# Patient Record
Sex: FEMALE | URBAN - METROPOLITAN AREA
[De-identification: names, ages, dates, MRNs, and addresses within clinical notes are randomized per-mention and may not be internally consistent; named-entity substitution may affect disease eponyms.]

---

## 2023-01-17 ENCOUNTER — OFFICE VISIT (OUTPATIENT)
Dept: SURGERY | Age: 43
End: 2023-01-17
Payer: MEDICAID

## 2023-01-17 ENCOUNTER — HOSPITAL ENCOUNTER (OUTPATIENT)
Dept: LAB | Age: 43
Discharge: HOME | End: 2023-01-17

## 2023-01-17 VITALS
RESPIRATION RATE: 16 BRPM | BODY MASS INDEX: 47.12 KG/M2 | SYSTOLIC BLOOD PRESSURE: 110 MMHG | WEIGHT: 276 LBS | TEMPERATURE: 97.7 F | HEIGHT: 64 IN | DIASTOLIC BLOOD PRESSURE: 70 MMHG | HEART RATE: 80 BPM | OXYGEN SATURATION: 99 %

## 2023-01-17 DIAGNOSIS — E66.01 MORBID OBESITY (HCC): Primary | ICD-10-CM

## 2023-01-17 DIAGNOSIS — E66.01 MORBID OBESITY (HCC): ICD-10-CM

## 2023-01-17 PROCEDURE — 99001 SPECIMEN HANDLING PT-LAB: CPT

## 2023-01-17 PROCEDURE — 99205 OFFICE O/P NEW HI 60 MIN: CPT | Performed by: STUDENT IN AN ORGANIZED HEALTH CARE EDUCATION/TRAINING PROGRAM

## 2023-01-17 NOTE — PROGRESS NOTES
Bariatric Surgery Initial Consult    Patient: Raul Lora MRN: [id number]  SSN: [id number]    YOB: 1980  Age: 36 y.o. Sex: female      Subjective:      CC: Morbid Obesity    Current Weight: 276  Body mass index is 47.38 kg/m². Ideal body weight: 54.7 kg (120 lb 9.5 oz)  Adjusted ideal body weight: 82.9 kg (182 lb 12.1 oz)  Excess Body Weight: 145    Raul Lora is a 36 y.o. female who is being seen for new bariatric consultation. She is struggled with her weight for a long period of time and has been considering bariatric surgery for about 2 years. She is previously able to lose weight and get down to around 230 with increased exercise but then regained to her current weight. She reports being at her highest weight now. She identifies eating late at night as being an issue with her diet. She has tried unsupervised diets in the past including Atkins, calorie counting, Noom, and intermittent fasting without lasting or sustained success. She reports increased weight causing arthropathy particularly in her knees as well as some dyspnea on exertion and limiting further exercise. She is interested in a sleeve gastrectomy. PMH: Denies    PSH: Lap hysterectomy, L breast lumpectomy 2016    NKDA    Social History     Tobacco Use    Smoking status: Never    Smokeless tobacco: Never   Substance Use Topics    Alcohol use: Not on file   - Smokes marijuana 1-3 times per week    FH: reviewed, strong family history of obesity as well as hypertension in her father     Last Pap Smear: N/A, no cervix post hysterectomy  Mammogram: Up-to-date  Colonoscopy: N/A  EGD/UGI: None  STOP-BANG score: 2  GERD-HRQL score: 0    Review of Systems:    General: Denies fevers, chills, night sweats, fatigue, weight loss, or weight gain.     HEENT: Denies changes in auditory or visual acuity, recurrent pharyngitis, epistaxis, chronic rhinorrhea, vertigo    Respiratory: + Shortness of the breath with exertion. Denies  productive cough, hemoptysis    Cardiac: Denies known history of cardiac disease, heart murmur, palpitations    GI: + Occasional loose stools after eating, particularly after greasy food or lactose. Denies dysphagia, recurrent emesis, hematemesis, changes in bowel habits, hematochezia, melena    : Denies hematuria frequency urgency dysuria    Musculoskeletal: Denies fractures, dislocations    Neurologic: Denies history of CVA, paralysis paresthesias, recurrent cephalgia, seizures    Endocrine: Denies polyuria, polydipsia, polyphagia, heat and cold intolerance    Lymph/heme: Denies a history of malignancy, anemia, bruising, blood transfusions    Integumentary: Negative for dermatitis     Psych: Denies a history of depression or anxiety    Objective:     Vitals:    01/17/23 1141   BP: 110/70   Pulse: 80   Resp: 16   Temp: 97.7 °F (36.5 °C)   TempSrc: Temporal   SpO2: 99%   Weight: 125.2 kg (276 lb)   Height: 5' 4\" (1.626 m)        General: no acute distress, nontoxic in appearance. Head: Normocephalic, atraumatic  Mouth: Clear, no overt lesions, oral mucosa is pink and moist.  Neck: Supple, no masses, trachea midline  Resp: Clear to auscultation bilaterally, no wheezing. Excursions normal and symmetrical.  Cardio: Regular rate and rhythm, no murmurs  Abdomen: soft, nontender, nondistended, no hernias. Extremities: Warm, well perfused, no tenderness or swelling, normal gait/station, without edema or varicosities  Neuro: Sensation and strength grossly intact and symmetrical.  Psych: Alert and oriented to person, place, and time. Labs:     N/A      Assessment: This patient is a 36 y.o. obese female with a current Body mass index is 47.38 kg/m². who is considering bariatric surgery, and would be an appropriate candidate for enrollment in the preoperative pathway.      The patient is considering Laparoscopic Sleeve Gastrectomy for surgical weight loss due to their ineffective progress with medical forms of weight loss and the urging of their physicians who care for their primary medical issues. The patient  now presents  for consideration for weight loss surgery understanding the benefits of this over a medical approach of weight loss as was discussed in our seminar on weight loss surgery. They have discussed their plans both with their family and primary care physician who is in support of their pursuit of such. The patient's goal weight is 175 lb. These goals are consistent with expected outcomes of their desired operation. Her Medical goals are resolution of these health issues. The possible short and long term  complications of the sleeve gastrectomy were also discussed, to include but not limited to;death, DVT/PE, staple line leak, bleeding, stricture formation, surgical site infection, and reflux. I spent a significant amount of time emphasizing the reflux risk from this procedure which ranges from 25-40% postoperatively. Specific weight related outcomes for success were also discussed with an emphasis on careful and close follow-up with the first year and dietary behavior modification over the first years as baseline cyclical hunger returns  The patient expressed an understanding of the above factors, and her questions were answered in their entirety. Plan: Will plan to enroll in the preoperative bariatric pathway  Will schedule consultation with our dietitian  Will schedule for preoperative psych clearance  Standard preoperative bariatric lab panel ordered. H. pylori breath test performed in clinic today  Upper GI, EKG ordered  Return to clinic for midpoint evaluation      I spent >60 minutes on this patient's care today, including reviewing all pertinent medical records, imaging, performing a history and physical exam, documenting, and coordinating future care.     Signed By: Chris Mitchell MD     January 17, 2023

## 2023-01-18 LAB — XX-LABCORP SPECIMEN COL,LCBCF: NORMAL

## 2023-01-19 LAB — UREA BREATH TEST QL: POSITIVE

## 2023-01-20 DIAGNOSIS — A04.8 H. PYLORI INFECTION: Primary | ICD-10-CM

## 2023-01-20 RX ORDER — OMEPRAZOLE 40 MG/1
40 CAPSULE, DELAYED RELEASE ORAL 2 TIMES DAILY
Qty: 28 CAPSULE | Refills: 0 | Status: SHIPPED | OUTPATIENT
Start: 2023-01-20 | End: 2023-02-03

## 2023-01-20 RX ORDER — TETRACYCLINE HYDROCHLORIDE 500 MG/1
500 CAPSULE ORAL
Qty: 56 CAPSULE | Refills: 0 | Status: SHIPPED | OUTPATIENT
Start: 2023-01-20

## 2023-01-20 RX ORDER — METRONIDAZOLE 500 MG/1
500 TABLET ORAL 3 TIMES DAILY
Qty: 42 TABLET | Refills: 0 | Status: SHIPPED | OUTPATIENT
Start: 2023-01-20 | End: 2023-02-03

## 2023-01-20 RX ORDER — BISMUTH SUBSALICYLATE 262 MG/1
2 TABLET, CHEWABLE ORAL EVERY 6 HOURS
Qty: 112 TABLET | Refills: 0 | Status: SHIPPED | OUTPATIENT
Start: 2023-01-20 | End: 2023-02-03

## 2023-01-24 ENCOUNTER — HOSPITAL ENCOUNTER (OUTPATIENT)
Dept: BARIATRICS/WEIGHT MGMT | Age: 43
Discharge: HOME | End: 2023-01-24

## 2023-01-24 VITALS — HEIGHT: 64 IN | WEIGHT: 272.8 LBS | BODY MASS INDEX: 46.57 KG/M2

## 2024-03-08 ENCOUNTER — HOSPITAL ENCOUNTER (OUTPATIENT)
Facility: HOSPITAL | Age: 44
End: 2024-03-08
Payer: MEDICAID

## 2024-03-08 ENCOUNTER — OFFICE VISIT (OUTPATIENT)
Age: 44
End: 2024-03-08

## 2024-03-08 ENCOUNTER — HOSPITAL ENCOUNTER (OUTPATIENT)
Facility: HOSPITAL | Age: 44
Discharge: HOME | End: 2024-03-11

## 2024-03-08 VITALS
DIASTOLIC BLOOD PRESSURE: 90 MMHG | HEIGHT: 64 IN | WEIGHT: 281.7 LBS | TEMPERATURE: 97.5 F | SYSTOLIC BLOOD PRESSURE: 150 MMHG | OXYGEN SATURATION: 100 % | HEART RATE: 81 BPM | BODY MASS INDEX: 48.09 KG/M2

## 2024-03-08 DIAGNOSIS — F12.90 MARIJUANA USE: ICD-10-CM

## 2024-03-08 DIAGNOSIS — Z87.2 HISTORY OF BREAST ABSCESS: ICD-10-CM

## 2024-03-08 DIAGNOSIS — A04.8 H. PYLORI INFECTION: ICD-10-CM

## 2024-03-08 DIAGNOSIS — E66.01 MORBID OBESITY WITH BODY MASS INDEX (BMI) OF 40.0 TO 49.9 (HCC): ICD-10-CM

## 2024-03-08 DIAGNOSIS — E66.01 MORBID OBESITY (HCC): Primary | ICD-10-CM

## 2024-03-08 DIAGNOSIS — Z01.818 PRE-OP EVALUATION: ICD-10-CM

## 2024-03-08 DIAGNOSIS — K30 FUNCTIONAL DYSPEPSIA: ICD-10-CM

## 2024-03-08 LAB — LABCORP SPECIMEN COLLECTION: NORMAL

## 2024-03-08 PROCEDURE — 99001 SPECIMEN HANDLING PT-LAB: CPT

## 2024-03-08 PROCEDURE — 93005 ELECTROCARDIOGRAM TRACING: CPT | Performed by: SPECIALIST

## 2024-03-08 ASSESSMENT — PATIENT HEALTH QUESTIONNAIRE - PHQ9
2. FEELING DOWN, DEPRESSED OR HOPELESS: 0
SUM OF ALL RESPONSES TO PHQ QUESTIONS 1-9: 0
SUM OF ALL RESPONSES TO PHQ9 QUESTIONS 1 & 2: 0
SUM OF ALL RESPONSES TO PHQ QUESTIONS 1-9: 0
1. LITTLE INTEREST OR PLEASURE IN DOING THINGS: 0
SUM OF ALL RESPONSES TO PHQ QUESTIONS 1-9: 0
SUM OF ALL RESPONSES TO PHQ QUESTIONS 1-9: 0

## 2024-03-08 NOTE — PROGRESS NOTES
Bariatric Surgery Consultation  Restart from Jan 2023 Dr. Escobedo Consult at 276 lbs    Subjective:     The patient is a 43 y.o. obese female with a Body mass index is 48.35 kg/m²..  The patient is currently her heaviest weight for the past several years.  she has been overweight since her teen years.   she has been considering surgery since last year.  she desires surgery at this time because of multiple health concerns and their lifestyle issues which are hindered by their weight. she has been referred by his family physician for evaluation and treatment of their obesity via surgical intervention. Yugonda Sample-Jones has tried multiple diets in her lifetime most recently tried multiple dietary plans both formal and informal.    Bariatric comorbidities present are   Patient Active Problem List   Diagnosis    Morbid obesity (HCC)    Morbid obesity with body mass index (BMI) of 40.0 to 49.9 (HCC)    Functional dyspepsia    H. pylori infection    Marijuana use    History of breast abscess     The patient is considering sleeve resection for surgical weight loss due to their ineffective progress with medical forms of weight loss and the urging of their physician who cares for their primary medical issues. The patient  now presents  for consideration for weight loss surgery understanding the benefits of this over a medical approach of weight loss as was discussed in our presentation on weight loss surgery. They have discussed their plans both with their family and primary care physician who is in support of their pursuit of such. The patient has had no health issues as of late and denies and gastrointestinal disturbances other than what is outlined below in their review of symptoms. All of their prior evaluations available by both their PCP's and specialists physicians have been reviewed today either in the Care Everywhere portal or scanned under the media tab.    I have spent a large portion of my initial

## 2024-03-09 LAB
ALBUMIN SERPL-MCNC: 3.9 G/DL (ref 3.9–4.9)
ALBUMIN/GLOB SERPL: 1.3 {RATIO} (ref 1.2–2.2)
ALP SERPL-CCNC: 80 IU/L (ref 44–121)
ALT SERPL-CCNC: 11 IU/L (ref 0–32)
AST SERPL-CCNC: 13 IU/L (ref 0–40)
BASOPHILS # BLD AUTO: 0.1 X10E3/UL (ref 0–0.2)
BASOPHILS NFR BLD AUTO: 1 %
BILIRUB SERPL-MCNC: 0.2 MG/DL (ref 0–1.2)
BUN SERPL-MCNC: 12 MG/DL (ref 6–24)
BUN/CREAT SERPL: 15 (ref 9–23)
CALCIUM SERPL-MCNC: 9.3 MG/DL (ref 8.7–10.2)
CHLORIDE SERPL-SCNC: 104 MMOL/L (ref 96–106)
CO2 SERPL-SCNC: 22 MMOL/L (ref 20–29)
CREAT SERPL-MCNC: 0.79 MG/DL (ref 0.57–1)
EGFRCR SERPLBLD CKD-EPI 2021: 95 ML/MIN/1.73
EKG ATRIAL RATE: 80 BPM
EKG DIAGNOSIS: NORMAL
EKG P AXIS: 14 DEGREES
EKG P-R INTERVAL: 168 MS
EKG Q-T INTERVAL: 392 MS
EKG QRS DURATION: 74 MS
EKG QTC CALCULATION (BAZETT): 452 MS
EKG R AXIS: 52 DEGREES
EKG T AXIS: 17 DEGREES
EKG VENTRICULAR RATE: 80 BPM
EOSINOPHIL # BLD AUTO: 0.4 X10E3/UL (ref 0–0.4)
EOSINOPHIL NFR BLD AUTO: 6 %
ERYTHROCYTE [DISTWIDTH] IN BLOOD BY AUTOMATED COUNT: 13.1 % (ref 11.7–15.4)
FERRITIN SERPL-MCNC: 74 NG/ML (ref 15–150)
GLOBULIN SER CALC-MCNC: 3.1 G/DL (ref 1.5–4.5)
GLUCOSE SERPL-MCNC: 92 MG/DL (ref 70–99)
HCT VFR BLD AUTO: 46.3 % (ref 34–46.6)
HGB BLD-MCNC: 14.6 G/DL (ref 11.1–15.9)
IMM GRANULOCYTES # BLD AUTO: 0 X10E3/UL (ref 0–0.1)
IMM GRANULOCYTES NFR BLD AUTO: 0 %
LYMPHOCYTES # BLD AUTO: 2.3 X10E3/UL (ref 0.7–3.1)
LYMPHOCYTES NFR BLD AUTO: 41 %
MCH RBC QN AUTO: 29.5 PG (ref 26.6–33)
MCHC RBC AUTO-ENTMCNC: 31.5 G/DL (ref 31.5–35.7)
MCV RBC AUTO: 94 FL (ref 79–97)
MONOCYTES # BLD AUTO: 0.4 X10E3/UL (ref 0.1–0.9)
MONOCYTES NFR BLD AUTO: 6 %
NEUTROPHILS # BLD AUTO: 2.6 X10E3/UL (ref 1.4–7)
NEUTROPHILS NFR BLD AUTO: 46 %
PLATELET # BLD AUTO: 277 X10E3/UL (ref 150–450)
POTASSIUM SERPL-SCNC: 4.5 MMOL/L (ref 3.5–5.2)
PROT SERPL-MCNC: 7 G/DL (ref 6–8.5)
RBC # BLD AUTO: 4.95 X10E6/UL (ref 3.77–5.28)
SODIUM SERPL-SCNC: 139 MMOL/L (ref 134–144)
T4 FREE SERPL-MCNC: 1 NG/DL (ref 0.82–1.77)
TSH SERPL DL<=0.005 MIU/L-ACNC: 1.02 UIU/ML (ref 0.45–4.5)
WBC # BLD AUTO: 5.7 X10E3/UL (ref 3.4–10.8)

## 2024-03-09 PROCEDURE — 93010 ELECTROCARDIOGRAM REPORT: CPT | Performed by: INTERNAL MEDICINE

## 2024-03-19 ENCOUNTER — TELEPHONE (OUTPATIENT)
Age: 44
End: 2024-03-19

## 2024-03-19 RX ORDER — BISMUTH SUBCITRATE POTASSIUM, METRONIDAZOLE AND TETRACYCLINE HYDROCHLORIDE 140; 125; 125 MG/1; MG/1; MG/1
3 CAPSULE ORAL
Qty: 120 CAPSULE | Refills: 0 | Status: SHIPPED | OUTPATIENT
Start: 2024-03-19 | End: 2024-03-29

## 2024-03-19 RX ORDER — OMEPRAZOLE 20 MG/1
20 CAPSULE, DELAYED RELEASE ORAL 2 TIMES DAILY
Qty: 20 CAPSULE | Refills: 0 | Status: SHIPPED | OUTPATIENT
Start: 2024-03-19

## 2024-03-19 NOTE — TELEPHONE ENCOUNTER
Per Jerry CARRILLO pt needs to be treated for H pylori. Pylera pack and omeprazole sent into pharm on file. Pt is aware.

## 2024-03-27 ENCOUNTER — HOSPITAL ENCOUNTER (OUTPATIENT)
Facility: HOSPITAL | Age: 44
Setting detail: OUTPATIENT SURGERY
Discharge: HOME | End: 2024-03-30
Payer: MEDICAID

## 2024-03-27 ENCOUNTER — HOSPITAL ENCOUNTER (OUTPATIENT)
Facility: HOSPITAL | Age: 44
Discharge: HOME | End: 2024-03-30
Payer: MEDICAID

## 2024-03-27 VITALS
BODY MASS INDEX: 48.65 KG/M2 | OXYGEN SATURATION: 98 % | TEMPERATURE: 98 F | WEIGHT: 285 LBS | SYSTOLIC BLOOD PRESSURE: 135 MMHG | DIASTOLIC BLOOD PRESSURE: 79 MMHG | HEIGHT: 64 IN | RESPIRATION RATE: 17 BRPM | HEART RATE: 87 BPM

## 2024-03-27 DIAGNOSIS — E66.01 MORBID OBESITY (HCC): ICD-10-CM

## 2024-03-27 PROCEDURE — 74220 X-RAY XM ESOPHAGUS 1CNTRST: CPT

## 2024-03-27 PROCEDURE — 74240 X-RAY XM UPR GI TRC 1CNTRST: CPT

## 2024-03-27 PROCEDURE — 2500000003 HC RX 250 WO HCPCS: Performed by: SPECIALIST

## 2024-03-27 RX ADMIN — BARIUM SULFATE 100 ML: 960 POWDER, FOR SUSPENSION ORAL at 15:30

## 2024-03-27 ASSESSMENT — PAIN - FUNCTIONAL ASSESSMENT: PAIN_FUNCTIONAL_ASSESSMENT: NONE - DENIES PAIN

## 2024-03-27 NOTE — PROCEDURES
Patient:Yugonda Sample-Jones   : 1980  Medical Record Number:[id number]            PREPROCEDURE DIAGNOSIS: This patient is preoperative for laparoscopic sleeve gastrectomy procedure with a history of  reflux disease.    POSTPROCEDURE DIAGNOSIS: This patient is preoperative for laparoscopic sleeve gastrectomy procedure with a history of  reflux disease.      PROCEDURES PERFORMED: Upper GI study with barium.    ESTIMATED BLOOD LOSS: None.    SPECIMENS: None.    STATEMENT OF MEDICAL NECESSITY: The patient is a patient with a  longstanding history of obesity. They are now considering the laparoscopic sleeve gastrectomy procedure as a means of surgical weight control and due to their history of reflux disease and are being assessed preoperatively for such.    DESCRIPTION OF PROCEDURE: The patient was brought to the fluoroscopy unit and  was given thin barium. On swallowing of barium, they were noted to have  normal peristalsis of their esophagus. They had prompt filling of distal  esophagus with tapering into the gastroesophageal junction. There was no evidence of a hiatal hernia present. Contrast then filled the gastric cardia, fundus,body and pre pyloric region with no abnormalities noted. Contrast then exited the pylorus in normal fashion. No obstruction was noted. There was no evidence of reflux noted.    (normal anatomy)    Jonathan Barlow MD

## 2024-03-27 NOTE — PROGRESS NOTES
[facility] UGI FOCUS NOTE      Date:  3/27/2024  Time:  3:29 PM    Patient:  Yugonda Mayur  Procedure:  UGI      Patient Questions  Lap Band Adjustment Patient Questionnaire:  If female, are you pregnant? []Yes     [x]No  Tolerates thick liquids:  [x]Well   []1     []2     []3     []4     []5     []Poorly  Tolerates red meat:  [x]Well   []1     []2     []3     []4     []5     [] Poorly  Tolerates chicken:  [x]Well   []1     []2     []3     []4     []5     []Poorly  Tolerates fish:   [x]Well   []1     []2     []3     []4     []5     []Poorly  Hunger is:   []Well Controlled     []1     [x]2     []3     []4     []5      [] Poorly Controlled  Nightime regurgitation:  [x]Never     []1     []2     []3     []4     []5     []Frequently    Lap Band Info:  Band Type  []Realize     []Realize-C     []AP     []VG     []10cm     []Unknown  []Other      Comments:        Linn Parikh RN

## 2024-04-03 ENCOUNTER — HOSPITAL ENCOUNTER (OUTPATIENT)
Facility: HOSPITAL | Age: 44
Discharge: HOME | End: 2024-04-06

## 2024-04-03 VITALS — HEIGHT: 64 IN | WEIGHT: 290.3 LBS | BODY MASS INDEX: 49.56 KG/M2

## 2024-04-03 NOTE — PROGRESS NOTES
Medical Weight Loss Multi-Disciplinary Program    Patient's Name: Yugonda Sample-Jones Age: 43 y.o.   YOB: 1980 Sex: female                     Session #1. Pt attended in-person class.  Weight obtained in office.    Date: 4/3/2024    Vitals:    04/03/24 1600   Weight: 131.7 kg (290 lb 4.8 oz)   Height: 1.626 m (5' 4\")      Body mass index is 49.83 kg/m².     Pounds Lost since last month: N/A   Pounds Gained since last month: N/A    Starting Weight: 281.7 lbs  Previous Month’s Weight: N/A  Overall Pounds Lost: 0 lbs  Overall Pounds Gained: 8.6 lbs      Class Guidelines    Guidelines are reviewed with patient at the start of every class.    1. Patient understands that weight loss trial classes must be consecutive.  Patient understands if they miss a class, it is their responsibility to contact me to reschedule class.  I will reach out to patient after their first no show.  2.  Patient understands the expectations that weight maintenance/weight loss is expected during the classes.  Failure to demonstrate changes may result in extension of weight loss trial, followed by returning to see the surgeon.  Patient understands that they CANNOT gain any weight during the weight loss trial.  Gaining weight will result in extension of weight loss trial.  3. Patient is also instructed to complete their labwork, psychological evaluation visit, and any other tests that the surgeon has used while they are working on their weight loss trial.  4.  Patient was instructed to bring their packet of nutrition education materials to every class and appointment.        Eating Habits and Behaviors    Today in class we reviewed the Key Diet Principles.  Patient was encouraged to consume 3 meals each day, and the timing the meals was reviewed.  Meal time behaviors that will help pt to be successful with their weight loss efforts were additionally reviewed.      We discussed the importance of drinking adequate amounts of fluids,

## 2024-05-02 ENCOUNTER — HOSPITAL ENCOUNTER (OUTPATIENT)
Facility: HOSPITAL | Age: 44
Discharge: HOME | End: 2024-05-05

## 2024-05-02 VITALS — HEIGHT: 64 IN | BODY MASS INDEX: 48.91 KG/M2 | WEIGHT: 286.5 LBS

## 2024-05-02 NOTE — PROGRESS NOTES
Medical Weight Loss Multi-Disciplinary Program    Patient's Name: Yugonda Sample-Jones Age: 43 y.o.   YOB: 1980 Sex: female     Session #2. Pt attended in-person class.  Weight obtained in office.    Date: 5/2/2024    Vitals:    05/02/24 1100   Weight: 130 kg (286 lb 8 oz)   Height: 1.626 m (5' 4\")      Body mass index is 49.18 kg/m².              Pounds Lost since last month: 3.8 lbs  Pounds Gained since last month: 0 lbs       Starting Weight: 281.7 lbs Previous Month’s Weight: 290.3 lbs   Overall Pounds Lost: 0 lbs Overall Pounds Gained: 4.8 lbs             Class Guidelines    Guidelines are reviewed with patient at the start of every class.    1. Patient understands that weight loss trial classes must be consecutive.  Patient understands if they miss a class, it is their responsibility to contact me to reschedule class.  I will reach out to patient after their first no show.  2.  Patient understands the expectations that weight maintenance/weight loss is expected during the classes.  Failure to demonstrate changes may result in extension of weight loss trial, followed by returning to see the surgeon.  Patient understands that they CANNOT gain any weight during the weight loss trial.  Gaining weight will result in extension of weight loss trial.  3. Patient is also instructed to complete their labwork, psychological evaluation visit, and any other tests that the surgeon has used while they are working on their weight loss trial.  4.  Patient was instructed to bring their packet of nutrition education materials to every class and appointment.        Eating Habits and Behaviors    Today in class we reviewed the Key Diet Principles.  Patient was encouraged to consume 3 meals each day, and the timing the meals was reviewed.  Meal time behaviors that will help pt to be successful with their weight loss efforts were additionally reviewed.      We discussed the importance of drinking adequate amounts of

## 2024-05-22 ENCOUNTER — OFFICE VISIT (OUTPATIENT)
Age: 44
End: 2024-05-22
Payer: MEDICAID

## 2024-05-22 VITALS
DIASTOLIC BLOOD PRESSURE: 77 MMHG | HEART RATE: 89 BPM | WEIGHT: 288 LBS | BODY MASS INDEX: 49.17 KG/M2 | RESPIRATION RATE: 16 BRPM | TEMPERATURE: 97.3 F | HEIGHT: 64 IN | OXYGEN SATURATION: 99 % | SYSTOLIC BLOOD PRESSURE: 134 MMHG

## 2024-05-22 DIAGNOSIS — E66.01 MORBID OBESITY WITH BODY MASS INDEX (BMI) OF 40.0 TO 49.9 (HCC): ICD-10-CM

## 2024-05-22 DIAGNOSIS — E66.01 MORBID OBESITY (HCC): Primary | ICD-10-CM

## 2024-05-22 PROBLEM — Z87.2 HISTORY OF BREAST ABSCESS: Status: RESOLVED | Noted: 2018-01-01 | Resolved: 2024-05-22

## 2024-05-22 PROCEDURE — 99214 OFFICE O/P EST MOD 30 MIN: CPT | Performed by: SPECIALIST

## 2024-05-22 ASSESSMENT — PATIENT HEALTH QUESTIONNAIRE - PHQ9
SUM OF ALL RESPONSES TO PHQ QUESTIONS 1-9: 0
SUM OF ALL RESPONSES TO PHQ QUESTIONS 1-9: 0
1. LITTLE INTEREST OR PLEASURE IN DOING THINGS: NOT AT ALL
2. FEELING DOWN, DEPRESSED OR HOPELESS: NOT AT ALL
SUM OF ALL RESPONSES TO PHQ QUESTIONS 1-9: 0
SUM OF ALL RESPONSES TO PHQ9 QUESTIONS 1 & 2: 0
SUM OF ALL RESPONSES TO PHQ QUESTIONS 1-9: 0

## 2024-05-22 NOTE — PROGRESS NOTES
Range    WBC 5.7 3.4 - 10.8 x10E3/uL    RBC 4.95 3.77 - 5.28 x10E6/uL    Hemoglobin 14.6 11.1 - 15.9 g/dL    Hematocrit 46.3 34.0 - 46.6 %    MCV 94 79 - 97 fL    MCH 29.5 26.6 - 33.0 pg    MCHC 31.5 31.5 - 35.7 g/dL    RDW 13.1 11.7 - 15.4 %    Platelets 277 150 - 450 x10E3/uL    Neutrophils % 46 Not Estab. %    Lymphocytes % 41 Not Estab. %    Monocytes % 6 Not Estab. %    Eosinophils % 6 Not Estab. %    Basophils % 1 Not Estab. %    Neutrophils Absolute 2.6 1.4 - 7.0 x10E3/uL    Lymphocytes Absolute 2.3 0.7 - 3.1 x10E3/uL    Monocytes Absolute 0.4 0.1 - 0.9 x10E3/uL    Eosinophils Absolute 0.4 0.0 - 0.4 x10E3/uL    Basophils Absolute 0.1 0.0 - 0.2 x10E3/uL    Immature Granulocytes % 0 Not Estab. %    Immature Grans (Abs) 0.0 0.0 - 0.1 x10E3/uL   Comprehensive Metabolic Panel    Collection Time: 03/08/24 12:00 AM   Result Value Ref Range    Glucose 92 70 - 99 mg/dL    BUN 12 6 - 24 mg/dL    Creatinine 0.79 0.57 - 1.00 mg/dL    Est, Glom Filt Rate 95 >59 mL/min/1.73    BUN/Creatinine Ratio 15 9 - 23    Sodium 139 134 - 144 mmol/L    Potassium 4.5 3.5 - 5.2 mmol/L    Chloride 104 96 - 106 mmol/L    CO2 22 20 - 29 mmol/L    Calcium 9.3 8.7 - 10.2 mg/dL    Total Protein 7.0 6.0 - 8.5 g/dL    Albumin 3.9 3.9 - 4.9 g/dL    Globulin, Total 3.1 1.5 - 4.5 g/dL    Albumin/Globulin Ratio 1.3 1.2 - 2.2    Total Bilirubin 0.2 0.0 - 1.2 mg/dL    Alkaline Phosphatase 80 44 - 121 IU/L    AST 13 0 - 40 IU/L    ALT 11 0 - 32 IU/L   Ferritin    Collection Time: 03/08/24 12:00 AM   Result Value Ref Range    Ferritin 74 15 - 150 ng/mL   TSH + Free T4 Panel    Collection Time: 03/08/24 12:00 AM   Result Value Ref Range    TSH 1.020 0.450 - 4.500 uIU/mL    T4 Free 1.00 0.82 - 1.77 ng/dL   EKG 12 Lead    Collection Time: 03/08/24 11:46 AM   Result Value Ref Range    Ventricular Rate 80 BPM    Atrial Rate 80 BPM    P-R Interval 168 ms    QRS Duration 74 ms    Q-T Interval 392 ms    QTc Calculation (Bazett) 452 ms    P Axis 14 degrees    R

## 2024-05-28 DIAGNOSIS — Z01.812 PRE-OPERATIVE LABORATORY EXAMINATION: ICD-10-CM

## 2024-05-28 DIAGNOSIS — E66.01 MORBID OBESITY WITH BODY MASS INDEX (BMI) OF 40.0 TO 49.9 (HCC): ICD-10-CM

## 2024-05-28 DIAGNOSIS — E66.01 MORBID OBESITY (HCC): Primary | ICD-10-CM

## 2024-05-28 DIAGNOSIS — E66.01 MORBID OBESITY (HCC): ICD-10-CM

## 2024-05-29 ENCOUNTER — HOSPITAL ENCOUNTER (OUTPATIENT)
Facility: HOSPITAL | Age: 44
Discharge: HOME | End: 2024-06-01

## 2024-05-29 LAB — LABCORP SPECIMEN COLLECTION: NORMAL

## 2024-05-29 PROCEDURE — 99001 SPECIMEN HANDLING PT-LAB: CPT

## 2024-05-30 LAB
ABO GROUP BLD: NORMAL
ALBUMIN SERPL-MCNC: 4.1 G/DL (ref 3.9–4.9)
ALBUMIN/GLOB SERPL: 1.6 {RATIO} (ref 1.2–2.2)
ALP SERPL-CCNC: 78 IU/L (ref 44–121)
ALT SERPL-CCNC: 19 IU/L (ref 0–32)
AST SERPL-CCNC: 19 IU/L (ref 0–40)
B-HCG SERPL QL: NEGATIVE MIU/ML
BASOPHILS # BLD AUTO: 0.1 X10E3/UL (ref 0–0.2)
BASOPHILS NFR BLD AUTO: 1 %
BILIRUB SERPL-MCNC: 0.4 MG/DL (ref 0–1.2)
BLD GP AB SCN SERPL QL: NEGATIVE
BUN SERPL-MCNC: 9 MG/DL (ref 6–24)
BUN/CREAT SERPL: 12 (ref 9–23)
CALCIUM SERPL-MCNC: 9.6 MG/DL (ref 8.7–10.2)
CHLORIDE SERPL-SCNC: 103 MMOL/L (ref 96–106)
CO2 SERPL-SCNC: 28 MMOL/L (ref 20–29)
CREAT SERPL-MCNC: 0.73 MG/DL (ref 0.57–1)
EGFRCR SERPLBLD CKD-EPI 2021: 104 ML/MIN/1.73
EOSINOPHIL # BLD AUTO: 0.3 X10E3/UL (ref 0–0.4)
EOSINOPHIL NFR BLD AUTO: 5 %
ERYTHROCYTE [DISTWIDTH] IN BLOOD BY AUTOMATED COUNT: 13 % (ref 11.7–15.4)
GLOBULIN SER CALC-MCNC: 2.5 G/DL (ref 1.5–4.5)
GLUCOSE SERPL-MCNC: 99 MG/DL (ref 70–99)
HCT VFR BLD AUTO: 44.6 % (ref 34–46.6)
HGB BLD-MCNC: 14.9 G/DL (ref 11.1–15.9)
IMM GRANULOCYTES # BLD AUTO: 0 X10E3/UL (ref 0–0.1)
IMM GRANULOCYTES NFR BLD AUTO: 0 %
LYMPHOCYTES # BLD AUTO: 2.5 X10E3/UL (ref 0.7–3.1)
LYMPHOCYTES NFR BLD AUTO: 43 %
MCH RBC QN AUTO: 30.5 PG (ref 26.6–33)
MCHC RBC AUTO-ENTMCNC: 33.4 G/DL (ref 31.5–35.7)
MCV RBC AUTO: 91 FL (ref 79–97)
MONOCYTES # BLD AUTO: 0.4 X10E3/UL (ref 0.1–0.9)
MONOCYTES NFR BLD AUTO: 7 %
NEUTROPHILS # BLD AUTO: 2.5 X10E3/UL (ref 1.4–7)
NEUTROPHILS NFR BLD AUTO: 44 %
PLATELET # BLD AUTO: 256 X10E3/UL (ref 150–450)
POTASSIUM SERPL-SCNC: 4.4 MMOL/L (ref 3.5–5.2)
PROT SERPL-MCNC: 6.6 G/DL (ref 6–8.5)
RBC # BLD AUTO: 4.89 X10E6/UL (ref 3.77–5.28)
RH BLD: POSITIVE
SODIUM SERPL-SCNC: 139 MMOL/L (ref 134–144)
WBC # BLD AUTO: 5.8 X10E3/UL (ref 3.4–10.8)

## 2024-06-03 ENCOUNTER — HOSPITAL ENCOUNTER (OUTPATIENT)
Facility: HOSPITAL | Age: 44
Discharge: HOME | End: 2024-06-06

## 2024-06-03 ENCOUNTER — HOSPITAL ENCOUNTER (OUTPATIENT)
Facility: HOSPITAL | Age: 44
Discharge: HOME | End: 2024-06-06
Payer: MEDICAID

## 2024-06-03 ENCOUNTER — OFFICE VISIT (OUTPATIENT)
Age: 44
End: 2024-06-03

## 2024-06-03 ENCOUNTER — CLINICAL DOCUMENTATION (OUTPATIENT)
Age: 44
End: 2024-06-03

## 2024-06-03 ENCOUNTER — TRANSCRIBE ORDERS (OUTPATIENT)
Facility: HOSPITAL | Age: 44
End: 2024-06-03

## 2024-06-03 VITALS
BODY MASS INDEX: 49.34 KG/M2 | HEART RATE: 91 BPM | OXYGEN SATURATION: 100 % | DIASTOLIC BLOOD PRESSURE: 117 MMHG | SYSTOLIC BLOOD PRESSURE: 157 MMHG | TEMPERATURE: 97.5 F | WEIGHT: 289 LBS | HEIGHT: 64 IN

## 2024-06-03 DIAGNOSIS — Z01.812 PRE-OPERATIVE LABORATORY EXAMINATION: ICD-10-CM

## 2024-06-03 DIAGNOSIS — E66.01 MORBID OBESITY WITH BODY MASS INDEX (BMI) OF 40.0 TO 49.9 (HCC): ICD-10-CM

## 2024-06-03 DIAGNOSIS — E66.01 MORBID OBESITY (HCC): Primary | ICD-10-CM

## 2024-06-03 DIAGNOSIS — E66.01 MORBID OBESITY (HCC): ICD-10-CM

## 2024-06-03 DIAGNOSIS — G89.18 POST-OP PAIN: Primary | ICD-10-CM

## 2024-06-03 LAB
ABO + RH BLD: NORMAL
BLOOD GROUP ANTIBODIES SERPL: NORMAL
EKG ATRIAL RATE: 77 BPM
EKG DIAGNOSIS: NORMAL
EKG P AXIS: 31 DEGREES
EKG P-R INTERVAL: 162 MS
EKG Q-T INTERVAL: 392 MS
EKG QRS DURATION: 82 MS
EKG QTC CALCULATION (BAZETT): 443 MS
EKG R AXIS: 35 DEGREES
EKG T AXIS: 64 DEGREES
EKG VENTRICULAR RATE: 77 BPM
SPECIMEN EXP DATE BLD: NORMAL

## 2024-06-03 PROCEDURE — 36415 COLL VENOUS BLD VENIPUNCTURE: CPT

## 2024-06-03 PROCEDURE — 86901 BLOOD TYPING SEROLOGIC RH(D): CPT

## 2024-06-03 PROCEDURE — 93005 ELECTROCARDIOGRAM TRACING: CPT

## 2024-06-03 PROCEDURE — 86850 RBC ANTIBODY SCREEN: CPT

## 2024-06-03 PROCEDURE — 86900 BLOOD TYPING SEROLOGIC ABO: CPT

## 2024-06-03 RX ORDER — ONDANSETRON 8 MG/1
8 TABLET, ORALLY DISINTEGRATING ORAL EVERY 8 HOURS PRN
Qty: 30 TABLET | Refills: 0 | Status: SHIPPED | OUTPATIENT
Start: 2024-06-03

## 2024-06-03 RX ORDER — PEDI MULTIVIT NO.25/FOLIC ACID 300 MCG
2 TABLET,CHEWABLE ORAL DAILY
COMMUNITY

## 2024-06-03 RX ORDER — OXYCODONE HYDROCHLORIDE AND ACETAMINOPHEN 5; 325 MG/1; MG/1
1 TABLET ORAL EVERY 6 HOURS PRN
Qty: 28 TABLET | Refills: 0 | Status: SHIPPED | OUTPATIENT
Start: 2024-06-03 | End: 2024-06-10

## 2024-06-03 RX ORDER — ENOXAPARIN SODIUM 100 MG/ML
INJECTION SUBCUTANEOUS
Qty: 28 EACH | Refills: 0 | Status: SHIPPED | OUTPATIENT
Start: 2024-06-03 | End: 2024-06-17

## 2024-06-03 RX ORDER — BACILLUS COAGULANS/INULIN 1B-250 MG
CAPSULE ORAL
COMMUNITY

## 2024-06-03 ASSESSMENT — PATIENT HEALTH QUESTIONNAIRE - PHQ9
SUM OF ALL RESPONSES TO PHQ9 QUESTIONS 1 & 2: 0
SUM OF ALL RESPONSES TO PHQ QUESTIONS 1-9: 0
1. LITTLE INTEREST OR PLEASURE IN DOING THINGS: NOT AT ALL
SUM OF ALL RESPONSES TO PHQ QUESTIONS 1-9: 0
2. FEELING DOWN, DEPRESSED OR HOPELESS: NOT AT ALL

## 2024-06-03 NOTE — PROGRESS NOTES
Patient attended bariatric surgery pre-operative education class today. Patient appeared to have an understanding of the surgical procedure, pre-op and post-op risks, and lifestyle changes.  Patient asked appropriate questions, and all questions were answered in their entirety.  Patient was given a bariatric binder of resources; form acknowledging receipt of said book was completed.  Lovenox injections and Zofran were sent to patient's pharmacy on file.

## 2024-06-03 NOTE — PROGRESS NOTES
foods, patient will need 40-60 g/d protein from supplemental shakes to meet the total daily intake goal of  g/d protein.  Patient understands the importance of being compliant with the diet and vitamin/mineral protocols, as well as the complications and risks that can occur if they are non-compliant.     Patient has also been educated on the pre-op clear liquid diet protocol for 1 day prior to surgery.  Patient understands that failure to comply with following the pre-op clear liquid diet could result in surgery being canceled.     Patient's virtual appointment for 2 week post-op nutrition education has been scheduled.  At this 2 week post-op visit, RD will assess how patient is tolerating liquids.  If patient is tolerating liquid diet without difficulty, RD will recommend advancement of patient's diet to soft/moist (puree) diet to provider.  Patient will also see RD again at 1-month post-op to assess tolerance of soft/moist diet and advance to soft protein with soft vegetables, if soft/moist diet is tolerated without difficulty.  RD will assess patient's compliance with current protocol, including diet, vitamins/minerals, protein shakes, and physical activity.  Post-op diet guidelines will be reinforced.  Patient provided with RD contact information, and RD is available for questions and to meet with patient outside of the 2 week and 1 month post-op visit.    RON DAVIS RD

## 2024-06-03 NOTE — H&P (VIEW-ONLY)
(LOVENOX) 40 MG/0.4ML, Inject 0.4 milliliters subcutaneously every 12 hours (Patient not taking: Reported on 6/3/2024), Disp: 28 each, Rfl: 0    ondansetron (ZOFRAN-ODT) 8 MG TBDP disintegrating tablet, Place 1 tablet under the tongue every 8 hours as needed for Nausea or Vomiting (Patient not taking: Reported on 6/3/2024), Disp: 30 tablet, Rfl: 0  Patient has no known allergies.     Review of Systems:     General - No history or complaints of unexpected fever, chills, or weight loss  Head/Neck - No history or complaints of headache, diplopia, dysphagia, hearing loss  Cardiac - No history or complaints of chest pain, palpitations, murmur, or shortness of breath  Pulmonary - No history or complaints of shortness of breath, productive cough, hemoptysis  Gastrointestinal - No history or complaints of reflux,  abdominal pain, obstipation/constipation, blood per rectum  Genitourinary - No history or complaints of hematuria/dysuria, stress urinary incontinence symptoms, or renal lithiasis  Musculoskeletal - No history or complaints of joint pain or muscular weakness  Hematologic - No history or complaints of bleeding disorders, blood transfusions, sickle cell anemia  Neurologic - No history or complaints of  migraine headaches, seizure activity, syncopal episodes, TIA or stroke  Integumentary - No history or complaints of rashes, abnormal nevi, skin cancer  Gynecological - n/a    Objective:     /65   Pulse 91   Temp 97.5 °F (36.4 °C)   Ht 1.626 m (5' 4\")   Wt 131.1 kg (289 lb)   SpO2 100%   BMI 49.61 kg/m²     Physical Examination: General appearance - alert, well appearing, and in no distress and oriented to person, place, and time  Mental status - alert, oriented to person, place, and time, normal mood, behavior, speech, dress, motor activity, and thought processes  Eyes - pupils equal and reactive, extraocular eye movements intact, sclera anicteric, left eye normal, right eye normal  Ears - right ear  operative risks over gastric bypass.    In the office today, following Yugonda's history and physical examination, a 40 minute discussion regarding the anatomic alterations for the laparoscopic sleeve gastrectomy was undertaken. The dietary expectations and the patient  dependent factors for success were thoroughly discussed, to include the need for interval follow-up and long-term dietary changes associated with success. The possible complications of the sleeve gastrectomy  were also discussed, to include;death, DVT/PE, staple line leak, bleeding, stricture formation, infection, nutritional deficiencies and sleeve dilation.  Specific weight related outcomes for success were also discussed with an emphasis on careful and close follow-up with the first year and eating behavior modification as the baseline and cyclical hunger return.  The patient expressed an understanding of the above factors, and her questions were answered in their entirety.    In addition, the patient attended a 1.5 hour power point seminar regarding obesity, surgical weight loss including, adjustable gastric band, gastric bypass, and sleeve gastrectomy.  This discussion contrasted the different surgical techniques, mechanisms of actions and expected outcomes, and surgical and medical risks associated with each procedure.  During this seminar, there was a long question and answer session where each questions was answered until there were no additional questions.     Today, the patient had all of her questions answered and the decision was made today that the patient's preoperative evaluation is acceptable for them  to proceed with bariatric surgery  choosing the sleeve gastrectomy as her surgical option.    The patient understands the plan of action        Secondary Diagnoses:     DVT / Pulmonary Embolus Risk - The patient is at a higher risk for post operative DVT / pulmonary embolus secondary to their morbid obese status, relative sedentary

## 2024-06-03 NOTE — PROGRESS NOTES
lifestyle, and impending general anesthetic.  We will plan to use anticoagulation therapy pre and post operative as well as  pneumatic compression devices and encourage ambulation once on the hospital nursing floor.  The need for possible at home anticoagulation therapy has also been discussed and any decision on this matter will be made during post operative evaluations. The patient understands that their efforts at ambulation are of vital importance to reduce the risk of this complication thus placing significant burden on them as to the prevention of such issues. Signs and symptoms of DVT / PE have been discussed with the patient and they have been instructed to call the office if any these occur in the \"at home\" post op phase    Hypertension - the patient understands, and we have discussed in detail, that this is an active acute health problem that has a multifactorial effect on their body from the standpoint of healing and complications related to surgery.  They understand that uncontrolled hypertension affects other organ processes and this leads to risk associated with surgical procedures.  They understand that in addition to hypertension, once they develop other health issues, their risk is exponentially worse.  Currently they understand that this likely one of the single most important items that they need to control in the perioperative process.  They understand that the hospitals protocol is that patients entering the operative suite should have a blood pressure reading less than 160/90 prior to surgery.  Elevated readings today in office above 160/90 are recommended emergent PCP follow-up or if symptomatic to proceed to the ED. The patient has a clear understanding of how weight loss improves hypertension as a whole, but also they understand that there is a significant genetic component to this disease process. We will monitor the patient’s blood pressure while in the hospital and the plan would be to

## 2024-06-12 ENCOUNTER — ANESTHESIA EVENT (OUTPATIENT)
Facility: HOSPITAL | Age: 44
End: 2024-06-12
Payer: MEDICAID

## 2024-06-12 ASSESSMENT — LIFESTYLE VARIABLES: SMOKING_STATUS: 1

## 2024-06-12 NOTE — ANESTHESIA PRE PROCEDURE
Department of Anesthesiology  Preprocedure Note       Name:  Yugonda Sample-Jones   Age:  43 y.o.  :  1980                                          MRN:  [id number]         Date:  2024      Surgeon: Surgeon(s):  Jonathan Barlow MD    Procedure: Procedure(s):  LAPAROSCOPIC SLEEVE GASTRECTOMY WITH POSSIBLE LIVER WEDGE BIOPSY AND INTRAOPERATIVE ENDOSCOPY    Medications prior to admission:   Prior to Admission medications    Medication Sig Start Date End Date Taking? Authorizing Provider   losartan-hydroCHLOROthiazide (HYZAAR) 50-12.5 MG per tablet Take 1 tablet by mouth every morning Indications: HTN    ProviderOmar MD   enoxaparin (LOVENOX) 40 MG/0.4ML Inject 0.4 milliliters subcutaneously every 12 hours  Patient not taking: Reported on 6/3/2024 6/3/24 6/17/24  Jonathan Barlow MD   ondansetron (ZOFRAN-ODT) 8 MG TBDP disintegrating tablet Place 1 tablet under the tongue every 8 hours as needed for Nausea or Vomiting  Patient not taking: Reported on 6/3/2024 6/3/24   Jonathan Barlow MD   Pediatric Multivitamins-Iron (FLINTSTONES COMPLETE) 18 MG CHEW Take 2 tablets by mouth daily    ProviderOmar MD   Bacillus Coagulans-Inulin (PROBIOTIC) 1-250 BILLION-MG CAPS Take by mouth    ProviderOmar MD       Current medications:    No current facility-administered medications for this encounter.     Current Outpatient Medications   Medication Sig Dispense Refill    losartan-hydroCHLOROthiazide (HYZAAR) 50-12.5 MG per tablet Take 1 tablet by mouth every morning Indications: HTN      enoxaparin (LOVENOX) 40 MG/0.4ML Inject 0.4 milliliters subcutaneously every 12 hours (Patient not taking: Reported on 6/3/2024) 28 each 0    ondansetron (ZOFRAN-ODT) 8 MG TBDP disintegrating tablet Place 1 tablet under the tongue every 8 hours as needed for Nausea or Vomiting (Patient not taking: Reported on 6/3/2024) 30 tablet 0    Pediatric Multivitamins-Iron (FLINTSTONES COMPLETE) 18 MG CHEW

## 2024-06-13 ENCOUNTER — HOSPITAL ENCOUNTER (INPATIENT)
Facility: HOSPITAL | Age: 44
LOS: 1 days | Discharge: HOME | End: 2024-06-14
Attending: SPECIALIST | Admitting: SPECIALIST
Payer: MEDICAID

## 2024-06-13 ENCOUNTER — ANESTHESIA (OUTPATIENT)
Facility: HOSPITAL | Age: 44
End: 2024-06-13
Payer: MEDICAID

## 2024-06-13 PROBLEM — K31.89 GASTRIC WALL THICKENING: Status: ACTIVE | Noted: 2024-06-13

## 2024-06-13 PROBLEM — K29.30 SUPERFICIAL GASTRITIS WITHOUT HEMORRHAGE: Status: ACTIVE | Noted: 2024-06-13

## 2024-06-13 PROBLEM — K76.0 NAFLD (NONALCOHOLIC FATTY LIVER DISEASE): Status: ACTIVE | Noted: 2024-06-13

## 2024-06-13 PROCEDURE — 2709999900 HC NON-CHARGEABLE SUPPLY: Performed by: SPECIALIST

## 2024-06-13 PROCEDURE — 1100000000 HC RM PRIVATE

## 2024-06-13 PROCEDURE — 6360000002 HC RX W HCPCS: Performed by: ANESTHESIOLOGY

## 2024-06-13 PROCEDURE — 6370000000 HC RX 637 (ALT 250 FOR IP): Performed by: SPECIALIST

## 2024-06-13 PROCEDURE — 3700000000 HC ANESTHESIA ATTENDED CARE: Performed by: SPECIALIST

## 2024-06-13 PROCEDURE — 0FB24ZX EXCISION OF LEFT LOBE LIVER, PERCUTANEOUS ENDOSCOPIC APPROACH, DIAGNOSTIC: ICD-10-PCS | Performed by: SPECIALIST

## 2024-06-13 PROCEDURE — 0DB64Z3 EXCISION OF STOMACH, PERCUTANEOUS ENDOSCOPIC APPROACH, VERTICAL: ICD-10-PCS | Performed by: SPECIALIST

## 2024-06-13 PROCEDURE — 3700000001 HC ADD 15 MINUTES (ANESTHESIA): Performed by: SPECIALIST

## 2024-06-13 PROCEDURE — 6360000002 HC RX W HCPCS: Performed by: SPECIALIST

## 2024-06-13 PROCEDURE — 3600000005 HC SURGERY LEVEL 5 BASE: Performed by: SPECIALIST

## 2024-06-13 PROCEDURE — 88307 TISSUE EXAM BY PATHOLOGIST: CPT

## 2024-06-13 PROCEDURE — 2720000010 HC SURG SUPPLY STERILE: Performed by: SPECIALIST

## 2024-06-13 PROCEDURE — C1781 MESH (IMPLANTABLE): HCPCS | Performed by: SPECIALIST

## 2024-06-13 PROCEDURE — 2500000003 HC RX 250 WO HCPCS: Performed by: NURSE ANESTHETIST, CERTIFIED REGISTERED

## 2024-06-13 PROCEDURE — 3600000015 HC SURGERY LEVEL 5 ADDTL 15MIN: Performed by: SPECIALIST

## 2024-06-13 PROCEDURE — 2500000003 HC RX 250 WO HCPCS: Performed by: SPECIALIST

## 2024-06-13 PROCEDURE — 0BQT4ZZ REPAIR DIAPHRAGM, PERCUTANEOUS ENDOSCOPIC APPROACH: ICD-10-PCS | Performed by: SPECIALIST

## 2024-06-13 PROCEDURE — C1713 ANCHOR/SCREW BN/BN,TIS/BN: HCPCS | Performed by: SPECIALIST

## 2024-06-13 PROCEDURE — 2580000003 HC RX 258: Performed by: SPECIALIST

## 2024-06-13 PROCEDURE — 7100000001 HC PACU RECOVERY - ADDTL 15 MIN: Performed by: SPECIALIST

## 2024-06-13 PROCEDURE — 6360000002 HC RX W HCPCS: Performed by: NURSE ANESTHETIST, CERTIFIED REGISTERED

## 2024-06-13 PROCEDURE — 7100000000 HC PACU RECOVERY - FIRST 15 MIN: Performed by: SPECIALIST

## 2024-06-13 PROCEDURE — 88313 SPECIAL STAINS GROUP 2: CPT

## 2024-06-13 PROCEDURE — 2700000000 HC OXYGEN THERAPY PER DAY

## 2024-06-13 PROCEDURE — C9113 INJ PANTOPRAZOLE SODIUM, VIA: HCPCS | Performed by: SPECIALIST

## 2024-06-13 RX ORDER — ENOXAPARIN SODIUM 100 MG/ML
40 INJECTION SUBCUTANEOUS ONCE
Status: COMPLETED | OUTPATIENT
Start: 2024-06-13 | End: 2024-06-13

## 2024-06-13 RX ORDER — SODIUM CHLORIDE 0.9 % (FLUSH) 0.9 %
5-40 SYRINGE (ML) INJECTION PRN
Status: DISCONTINUED | OUTPATIENT
Start: 2024-06-13 | End: 2024-06-14 | Stop reason: HOSPADM

## 2024-06-13 RX ORDER — METOCLOPRAMIDE HYDROCHLORIDE 5 MG/ML
INJECTION INTRAMUSCULAR; INTRAVENOUS PRN
Status: DISCONTINUED | OUTPATIENT
Start: 2024-06-13 | End: 2024-06-13 | Stop reason: SDUPTHER

## 2024-06-13 RX ORDER — SODIUM CHLORIDE 0.9 % (FLUSH) 0.9 %
5-40 SYRINGE (ML) INJECTION EVERY 12 HOURS SCHEDULED
Status: DISCONTINUED | OUTPATIENT
Start: 2024-06-13 | End: 2024-06-14 | Stop reason: HOSPADM

## 2024-06-13 RX ORDER — CLONIDINE 0.3 MG/24H
1 PATCH, EXTENDED RELEASE TRANSDERMAL ONCE
Status: DISCONTINUED | OUTPATIENT
Start: 2024-06-13 | End: 2024-06-14 | Stop reason: HOSPADM

## 2024-06-13 RX ORDER — FENTANYL CITRATE 50 UG/ML
INJECTION, SOLUTION INTRAMUSCULAR; INTRAVENOUS PRN
Status: DISCONTINUED | OUTPATIENT
Start: 2024-06-13 | End: 2024-06-13 | Stop reason: SDUPTHER

## 2024-06-13 RX ORDER — SODIUM CHLORIDE 0.9 % (FLUSH) 0.9 %
5-40 SYRINGE (ML) INJECTION PRN
Status: DISCONTINUED | OUTPATIENT
Start: 2024-06-13 | End: 2024-06-13 | Stop reason: HOSPADM

## 2024-06-13 RX ORDER — PROPOFOL 10 MG/ML
INJECTION, EMULSION INTRAVENOUS PRN
Status: DISCONTINUED | OUTPATIENT
Start: 2024-06-13 | End: 2024-06-13 | Stop reason: SDUPTHER

## 2024-06-13 RX ORDER — NALOXONE HYDROCHLORIDE 0.4 MG/ML
INJECTION, SOLUTION INTRAMUSCULAR; INTRAVENOUS; SUBCUTANEOUS PRN
Status: DISCONTINUED | OUTPATIENT
Start: 2024-06-13 | End: 2024-06-13 | Stop reason: HOSPADM

## 2024-06-13 RX ORDER — SODIUM CHLORIDE 9 MG/ML
INJECTION, SOLUTION INTRAVENOUS PRN
Status: DISCONTINUED | OUTPATIENT
Start: 2024-06-13 | End: 2024-06-13 | Stop reason: HOSPADM

## 2024-06-13 RX ORDER — LABETALOL HYDROCHLORIDE 5 MG/ML
10 INJECTION, SOLUTION INTRAVENOUS
Status: COMPLETED | OUTPATIENT
Start: 2024-06-13 | End: 2024-06-13

## 2024-06-13 RX ORDER — ROCURONIUM BROMIDE 10 MG/ML
INJECTION, SOLUTION INTRAVENOUS PRN
Status: DISCONTINUED | OUTPATIENT
Start: 2024-06-13 | End: 2024-06-13 | Stop reason: SDUPTHER

## 2024-06-13 RX ORDER — LABETALOL HYDROCHLORIDE 5 MG/ML
10 INJECTION, SOLUTION INTRAVENOUS ONCE
Status: DISCONTINUED | OUTPATIENT
Start: 2024-06-13 | End: 2024-06-13

## 2024-06-13 RX ORDER — SODIUM CHLORIDE, SODIUM LACTATE, POTASSIUM CHLORIDE, CALCIUM CHLORIDE 600; 310; 30; 20 MG/100ML; MG/100ML; MG/100ML; MG/100ML
INJECTION, SOLUTION INTRAVENOUS CONTINUOUS
Status: DISCONTINUED | OUTPATIENT
Start: 2024-06-13 | End: 2024-06-14 | Stop reason: HOSPADM

## 2024-06-13 RX ORDER — SIMETHICONE 80 MG
80 TABLET,CHEWABLE ORAL EVERY 6 HOURS PRN
Status: DISCONTINUED | OUTPATIENT
Start: 2024-06-13 | End: 2024-06-14 | Stop reason: HOSPADM

## 2024-06-13 RX ORDER — GLYCOPYRROLATE 0.2 MG/ML
INJECTION INTRAMUSCULAR; INTRAVENOUS PRN
Status: DISCONTINUED | OUTPATIENT
Start: 2024-06-13 | End: 2024-06-13 | Stop reason: SDUPTHER

## 2024-06-13 RX ORDER — ONDANSETRON 4 MG/1
4 TABLET, ORALLY DISINTEGRATING ORAL EVERY 8 HOURS PRN
Status: DISCONTINUED | OUTPATIENT
Start: 2024-06-13 | End: 2024-06-14 | Stop reason: HOSPADM

## 2024-06-13 RX ORDER — MEPERIDINE HYDROCHLORIDE 50 MG/ML
12.5 INJECTION INTRAMUSCULAR; INTRAVENOUS; SUBCUTANEOUS AS NEEDED
Status: DISCONTINUED | OUTPATIENT
Start: 2024-06-13 | End: 2024-06-13 | Stop reason: HOSPADM

## 2024-06-13 RX ORDER — ONDANSETRON 2 MG/ML
INJECTION INTRAMUSCULAR; INTRAVENOUS PRN
Status: DISCONTINUED | OUTPATIENT
Start: 2024-06-13 | End: 2024-06-13 | Stop reason: SDUPTHER

## 2024-06-13 RX ORDER — SODIUM CHLORIDE 0.9 % (FLUSH) 0.9 %
5-40 SYRINGE (ML) INJECTION EVERY 12 HOURS SCHEDULED
Status: DISCONTINUED | OUTPATIENT
Start: 2024-06-13 | End: 2024-06-13 | Stop reason: HOSPADM

## 2024-06-13 RX ORDER — LIDOCAINE HYDROCHLORIDE 20 MG/ML
INJECTION, SOLUTION EPIDURAL; INFILTRATION; INTRACAUDAL; PERINEURAL PRN
Status: DISCONTINUED | OUTPATIENT
Start: 2024-06-13 | End: 2024-06-13 | Stop reason: SDUPTHER

## 2024-06-13 RX ORDER — MORPHINE SULFATE 10 MG/ML
6 INJECTION, SOLUTION INTRAMUSCULAR; INTRAVENOUS
Status: DISCONTINUED | OUTPATIENT
Start: 2024-06-13 | End: 2024-06-14

## 2024-06-13 RX ORDER — FENTANYL CITRATE 50 UG/ML
25 INJECTION, SOLUTION INTRAMUSCULAR; INTRAVENOUS EVERY 5 MIN PRN
Status: DISCONTINUED | OUTPATIENT
Start: 2024-06-13 | End: 2024-06-13 | Stop reason: HOSPADM

## 2024-06-13 RX ORDER — DROPERIDOL 2.5 MG/ML
0.62 INJECTION, SOLUTION INTRAMUSCULAR; INTRAVENOUS
Status: DISCONTINUED | OUTPATIENT
Start: 2024-06-13 | End: 2024-06-13 | Stop reason: HOSPADM

## 2024-06-13 RX ORDER — HYDROMORPHONE HYDROCHLORIDE 1 MG/ML
0.5 INJECTION, SOLUTION INTRAMUSCULAR; INTRAVENOUS; SUBCUTANEOUS EVERY 5 MIN PRN
Status: DISCONTINUED | OUTPATIENT
Start: 2024-06-13 | End: 2024-06-13 | Stop reason: HOSPADM

## 2024-06-13 RX ORDER — OXYCODONE HYDROCHLORIDE 5 MG/1
5 TABLET ORAL
Status: DISCONTINUED | OUTPATIENT
Start: 2024-06-13 | End: 2024-06-13 | Stop reason: HOSPADM

## 2024-06-13 RX ORDER — ONDANSETRON 2 MG/ML
4 INJECTION INTRAMUSCULAR; INTRAVENOUS EVERY 6 HOURS PRN
Status: DISCONTINUED | OUTPATIENT
Start: 2024-06-13 | End: 2024-06-14 | Stop reason: HOSPADM

## 2024-06-13 RX ORDER — DIPHENHYDRAMINE HYDROCHLORIDE 50 MG/ML
12.5 INJECTION INTRAMUSCULAR; INTRAVENOUS
Status: DISCONTINUED | OUTPATIENT
Start: 2024-06-13 | End: 2024-06-13 | Stop reason: HOSPADM

## 2024-06-13 RX ORDER — SODIUM CHLORIDE 9 MG/ML
INJECTION, SOLUTION INTRAVENOUS PRN
Status: DISCONTINUED | OUTPATIENT
Start: 2024-06-13 | End: 2024-06-14 | Stop reason: HOSPADM

## 2024-06-13 RX ORDER — IPRATROPIUM BROMIDE AND ALBUTEROL SULFATE 2.5; .5 MG/3ML; MG/3ML
1 SOLUTION RESPIRATORY (INHALATION)
Status: DISCONTINUED | OUTPATIENT
Start: 2024-06-13 | End: 2024-06-13 | Stop reason: HOSPADM

## 2024-06-13 RX ORDER — PHENYLEPHRINE HCL IN 0.9% NACL 1 MG/10 ML
SYRINGE (ML) INTRAVENOUS PRN
Status: DISCONTINUED | OUTPATIENT
Start: 2024-06-13 | End: 2024-06-13 | Stop reason: SDUPTHER

## 2024-06-13 RX ORDER — MIDAZOLAM HYDROCHLORIDE 1 MG/ML
INJECTION INTRAMUSCULAR; INTRAVENOUS PRN
Status: DISCONTINUED | OUTPATIENT
Start: 2024-06-13 | End: 2024-06-13 | Stop reason: SDUPTHER

## 2024-06-13 RX ORDER — SODIUM CHLORIDE, SODIUM LACTATE, POTASSIUM CHLORIDE, CALCIUM CHLORIDE 600; 310; 30; 20 MG/100ML; MG/100ML; MG/100ML; MG/100ML
INJECTION, SOLUTION INTRAVENOUS CONTINUOUS
Status: DISCONTINUED | OUTPATIENT
Start: 2024-06-13 | End: 2024-06-13 | Stop reason: HOSPADM

## 2024-06-13 RX ORDER — ONDANSETRON 2 MG/ML
4 INJECTION INTRAMUSCULAR; INTRAVENOUS
Status: DISCONTINUED | OUTPATIENT
Start: 2024-06-13 | End: 2024-06-13 | Stop reason: HOSPADM

## 2024-06-13 RX ORDER — SODIUM CHLORIDE, SODIUM LACTATE, POTASSIUM CHLORIDE, CALCIUM CHLORIDE 600; 310; 30; 20 MG/100ML; MG/100ML; MG/100ML; MG/100ML
INJECTION, SOLUTION INTRAVENOUS CONTINUOUS
Status: DISCONTINUED | OUTPATIENT
Start: 2024-06-13 | End: 2024-06-13

## 2024-06-13 RX ORDER — ENOXAPARIN SODIUM 100 MG/ML
40 INJECTION SUBCUTANEOUS EVERY 12 HOURS SCHEDULED
Status: DISCONTINUED | OUTPATIENT
Start: 2024-06-13 | End: 2024-06-14 | Stop reason: HOSPADM

## 2024-06-13 RX ORDER — KETOROLAC TROMETHAMINE 30 MG/ML
30 INJECTION, SOLUTION INTRAMUSCULAR; INTRAVENOUS EVERY 6 HOURS
Status: DISCONTINUED | OUTPATIENT
Start: 2024-06-13 | End: 2024-06-14 | Stop reason: HOSPADM

## 2024-06-13 RX ORDER — KETOROLAC TROMETHAMINE 30 MG/ML
INJECTION, SOLUTION INTRAMUSCULAR; INTRAVENOUS PRN
Status: DISCONTINUED | OUTPATIENT
Start: 2024-06-13 | End: 2024-06-13 | Stop reason: SDUPTHER

## 2024-06-13 RX ORDER — FUROSEMIDE 10 MG/ML
20 INJECTION INTRAMUSCULAR; INTRAVENOUS ONCE
Status: COMPLETED | OUTPATIENT
Start: 2024-06-13 | End: 2024-06-13

## 2024-06-13 RX ORDER — ACETAMINOPHEN 500 MG
1000 TABLET ORAL ONCE
Status: COMPLETED | OUTPATIENT
Start: 2024-06-13 | End: 2024-06-13

## 2024-06-13 RX ADMIN — MORPHINE SULFATE 6 MG: 10 INJECTION INTRAVENOUS at 14:57

## 2024-06-13 RX ADMIN — KETOROLAC TROMETHAMINE 30 MG: 30 INJECTION, SOLUTION INTRAMUSCULAR; INTRAVENOUS at 09:47

## 2024-06-13 RX ADMIN — ONDANSETRON 4 MG: 2 INJECTION INTRAMUSCULAR; INTRAVENOUS at 09:37

## 2024-06-13 RX ADMIN — LABETALOL HYDROCHLORIDE 10 MG: 5 INJECTION INTRAVENOUS at 11:18

## 2024-06-13 RX ADMIN — SUGAMMADEX 300 MG: 100 INJECTION, SOLUTION INTRAVENOUS at 10:01

## 2024-06-13 RX ADMIN — HYDROMORPHONE HYDROCHLORIDE 1 MG: 1 INJECTION, SOLUTION INTRAMUSCULAR; INTRAVENOUS; SUBCUTANEOUS at 09:58

## 2024-06-13 RX ADMIN — Medication 200 MCG: at 09:03

## 2024-06-13 RX ADMIN — FENTANYL CITRATE 100 MCG: 50 INJECTION, SOLUTION INTRAMUSCULAR; INTRAVENOUS at 08:45

## 2024-06-13 RX ADMIN — ROCURONIUM BROMIDE 50 MG: 50 INJECTION INTRAVENOUS at 08:49

## 2024-06-13 RX ADMIN — FUROSEMIDE 20 MG: 10 INJECTION, SOLUTION INTRAMUSCULAR; INTRAVENOUS at 16:39

## 2024-06-13 RX ADMIN — HYDROMORPHONE HYDROCHLORIDE 0.5 MG: 1 INJECTION, SOLUTION INTRAMUSCULAR; INTRAVENOUS; SUBCUTANEOUS at 10:35

## 2024-06-13 RX ADMIN — PROPOFOL 200 MG: 10 INJECTION, EMULSION INTRAVENOUS at 08:49

## 2024-06-13 RX ADMIN — METOCLOPRAMIDE 10 MG: 5 INJECTION, SOLUTION INTRAMUSCULAR; INTRAVENOUS at 09:39

## 2024-06-13 RX ADMIN — PANTOPRAZOLE SODIUM 40 MG: 40 INJECTION, POWDER, FOR SOLUTION INTRAVENOUS at 21:31

## 2024-06-13 RX ADMIN — GLYCOPYRROLATE 0.1 MG: 0.2 INJECTION INTRAMUSCULAR; INTRAVENOUS at 09:16

## 2024-06-13 RX ADMIN — WATER 3000 MG: 1 INJECTION INTRAMUSCULAR; INTRAVENOUS; SUBCUTANEOUS at 08:52

## 2024-06-13 RX ADMIN — LABETALOL HYDROCHLORIDE 10 MG: 5 INJECTION INTRAVENOUS at 11:01

## 2024-06-13 RX ADMIN — Medication 200 MCG: at 09:20

## 2024-06-13 RX ADMIN — SIMETHICONE 80 MG: 80 TABLET, CHEWABLE ORAL at 21:35

## 2024-06-13 RX ADMIN — ROCURONIUM BROMIDE 20 MG: 50 INJECTION INTRAVENOUS at 09:01

## 2024-06-13 RX ADMIN — ENOXAPARIN SODIUM 40 MG: 100 INJECTION SUBCUTANEOUS at 21:31

## 2024-06-13 RX ADMIN — MORPHINE SULFATE 6 MG: 10 INJECTION INTRAVENOUS at 22:39

## 2024-06-13 RX ADMIN — Medication 100 MCG: at 08:58

## 2024-06-13 RX ADMIN — Medication 200 MCG: at 09:19

## 2024-06-13 RX ADMIN — ENOXAPARIN SODIUM 40 MG: 100 INJECTION SUBCUTANEOUS at 08:01

## 2024-06-13 RX ADMIN — LIDOCAINE HYDROCHLORIDE 100 MG: 20 INJECTION, SOLUTION EPIDURAL; INFILTRATION; INTRACAUDAL; PERINEURAL at 08:49

## 2024-06-13 RX ADMIN — HYDROMORPHONE HYDROCHLORIDE 0.5 MG: 1 INJECTION, SOLUTION INTRAMUSCULAR; INTRAVENOUS; SUBCUTANEOUS at 10:42

## 2024-06-13 RX ADMIN — ACETAMINOPHEN 1000 MG: 500 TABLET ORAL at 08:01

## 2024-06-13 RX ADMIN — NYSTATIN 500000 UNITS: 100000 SUSPENSION ORAL at 08:02

## 2024-06-13 RX ADMIN — FAMOTIDINE 20 MG: 10 INJECTION, SOLUTION INTRAVENOUS at 08:02

## 2024-06-13 RX ADMIN — Medication 200 MCG: at 09:15

## 2024-06-13 RX ADMIN — Medication 100 MCG: at 09:07

## 2024-06-13 RX ADMIN — ONDANSETRON 4 MG: 2 INJECTION INTRAMUSCULAR; INTRAVENOUS at 18:28

## 2024-06-13 RX ADMIN — SODIUM CHLORIDE, POTASSIUM CHLORIDE, SODIUM LACTATE AND CALCIUM CHLORIDE: 600; 310; 30; 20 INJECTION, SOLUTION INTRAVENOUS at 08:06

## 2024-06-13 RX ADMIN — KETOROLAC TROMETHAMINE 30 MG: 30 INJECTION, SOLUTION INTRAMUSCULAR; INTRAVENOUS at 21:30

## 2024-06-13 RX ADMIN — GLYCOPYRROLATE 0.1 MG: 0.2 INJECTION INTRAMUSCULAR; INTRAVENOUS at 09:13

## 2024-06-13 RX ADMIN — MIDAZOLAM 2 MG: 1 INJECTION INTRAMUSCULAR; INTRAVENOUS at 08:41

## 2024-06-13 RX ADMIN — SODIUM CHLORIDE, PRESERVATIVE FREE 10 ML: 5 INJECTION INTRAVENOUS at 21:31

## 2024-06-13 RX ADMIN — KETOROLAC TROMETHAMINE 30 MG: 30 INJECTION, SOLUTION INTRAMUSCULAR; INTRAVENOUS at 16:12

## 2024-06-13 ASSESSMENT — PAIN DESCRIPTION - ORIENTATION
ORIENTATION: MID
ORIENTATION: MID
ORIENTATION: ANTERIOR;MID
ORIENTATION: MID
ORIENTATION: MID;ANTERIOR
ORIENTATION: LEFT
ORIENTATION: MID
ORIENTATION: ANTERIOR;MID
ORIENTATION: ANTERIOR;MID
ORIENTATION: MID
ORIENTATION: MID

## 2024-06-13 ASSESSMENT — PAIN SCALES - GENERAL
PAINLEVEL_OUTOF10: 5
PAINLEVEL_OUTOF10: 3
PAINLEVEL_OUTOF10: 0
PAINLEVEL_OUTOF10: 4
PAINLEVEL_OUTOF10: 5
PAINLEVEL_OUTOF10: 5
PAINLEVEL_OUTOF10: 4
PAINLEVEL_OUTOF10: 5
PAINLEVEL_OUTOF10: 0
PAINLEVEL_OUTOF10: 5
PAINLEVEL_OUTOF10: 6

## 2024-06-13 ASSESSMENT — PAIN DESCRIPTION - PAIN TYPE
TYPE: SURGICAL PAIN

## 2024-06-13 ASSESSMENT — PAIN DESCRIPTION - LOCATION
LOCATION: ABDOMEN

## 2024-06-13 ASSESSMENT — PAIN DESCRIPTION - FREQUENCY
FREQUENCY: INTERMITTENT
FREQUENCY: INTERMITTENT
FREQUENCY: CONTINUOUS
FREQUENCY: INTERMITTENT
FREQUENCY: CONTINUOUS
FREQUENCY: INTERMITTENT
FREQUENCY: CONTINUOUS
FREQUENCY: INTERMITTENT
FREQUENCY: INTERMITTENT
FREQUENCY: CONTINUOUS
FREQUENCY: INTERMITTENT

## 2024-06-13 ASSESSMENT — PAIN - FUNCTIONAL ASSESSMENT
PAIN_FUNCTIONAL_ASSESSMENT: PREVENTS OR INTERFERES SOME ACTIVE ACTIVITIES AND ADLS
PAIN_FUNCTIONAL_ASSESSMENT: ACTIVITIES ARE NOT PREVENTED
PAIN_FUNCTIONAL_ASSESSMENT: PREVENTS OR INTERFERES SOME ACTIVE ACTIVITIES AND ADLS
PAIN_FUNCTIONAL_ASSESSMENT: ACTIVITIES ARE NOT PREVENTED
PAIN_FUNCTIONAL_ASSESSMENT: PREVENTS OR INTERFERES SOME ACTIVE ACTIVITIES AND ADLS
PAIN_FUNCTIONAL_ASSESSMENT: ACTIVITIES ARE NOT PREVENTED
PAIN_FUNCTIONAL_ASSESSMENT: ACTIVITIES ARE NOT PREVENTED
PAIN_FUNCTIONAL_ASSESSMENT: 0-10
PAIN_FUNCTIONAL_ASSESSMENT: PREVENTS OR INTERFERES SOME ACTIVE ACTIVITIES AND ADLS

## 2024-06-13 ASSESSMENT — PAIN DESCRIPTION - DESCRIPTORS
DESCRIPTORS: DISCOMFORT;DULL
DESCRIPTORS: DULL;DISCOMFORT
DESCRIPTORS: SHARP
DESCRIPTORS: ACHING;DISCOMFORT
DESCRIPTORS: DISCOMFORT;DULL
DESCRIPTORS: DISCOMFORT
DESCRIPTORS: DULL;DISCOMFORT
DESCRIPTORS: DISCOMFORT;DULL
DESCRIPTORS: DISCOMFORT;TIGHTNESS;SORE
DESCRIPTORS: DULL;DISCOMFORT
DESCRIPTORS: ACHING;DISCOMFORT
DESCRIPTORS: DULL;DISCOMFORT
DESCRIPTORS: DISCOMFORT;TIGHTNESS

## 2024-06-13 ASSESSMENT — PAIN DESCRIPTION - ONSET
ONSET: GRADUAL
ONSET: ON-GOING
ONSET: GRADUAL
ONSET: GRADUAL
ONSET: ON-GOING

## 2024-06-13 ASSESSMENT — PAIN SCALES - WONG BAKER: WONGBAKER_NUMERICALRESPONSE: HURTS A LITTLE BIT

## 2024-06-13 NOTE — PERIOP NOTE
Reviewed PTA medication list with patient/caregiver and patient/caregiver denies any additional medications.     Patient admits to having a responsible adult care for them at home for at least 24 hours after surgery.    Patient encouraged to use gown warming system and informed that using said warming gown to regulate body temperature prior to a procedure has been shown to help reduce the risks of blood clots and infection.    Patient's pharmacy of choice verified and documented in PTA medication section.    Dual skin assessment & fall risk band verification completed with NATALY Cm RN.

## 2024-06-13 NOTE — PERIOP NOTE
Spoke to Dr. Mason regarding Patient BP being elevated 167/109. He would like for patient to receive 10 mg of labetalol.

## 2024-06-13 NOTE — OP NOTE
OPERATIVE REPORT         Patient:Yugonda Sample-Jones   : 1980  Medical Record Number:[id number]    Pre-operative Diagnosis:  Morbid obesity (HCC) [E66.01]  Body mass index 45.0-49.9, adult (HCC) [Z68.42]  Post-operative Diagnosis: * No post-op diagnosis entered *  Procedure: Procedure(s):  LAPAROSCOPIC SLEEVE GASTRECTOMY  OVER SEW GASTRIC STAPLE LINE  DIAPHRAGMATIC HERNIA REPAIR  LIVER WEDGE BIOPSY  Location: [facility]  Surgeon: Jonathan Barlow MD  Assistant:  Jerry Gu PAC  - (there are no qualified interns, residents, or any other qualified house   physicians available to assist with this surgery) performed retraction of various structures,  assisted in   creation of the gastric sleeve, fired stapling devices, obtained hemostasis along staple lines via hemoclips,       Anesthesia: General       Specimens: 1. Gastric Sleeve Resection                       2. Liver Wedge Biopsy                     EBL: less than 5 cc  Additional Findings: None  Complications: None however the endoscopy tower appeared to blow a fuse and was unusable therefore an EGD was not done             STATEMENT OF MEDICAL NECESSITY: The patient is a 43 y.o.-year-old female who has   had a history of obesity. she has failed conservative weight loss measures,   such began to consider weight loss surgical options. she chose the   sleeve gastrectomy as a means of surgical weight control. she has undergone   nutritional and psychological teaching at this time period and does wish to proceed   with sleeve gastrectomy.     OPERATIVE PROCEDURE: The patient was brought to the operating room, placed   on the table in supine position at which time general  anesthesia was administered   without any difficulty. The abdomen was then prepped and draped in the   usual sterile fashion. Using a 15 blade, a 1 cm incision was made just to the   left of the umbilicus. The veress needle approach was used to gain access to   the  oversewing staple lines almost certainly reduces the leak rate in   an area of high risk region of the stomach. This went without event.   I then obtained hemostasis along the staple line using   Hemoclips and sutures where needed.  I then used 2 separate 2-0 Ethibond sutures to secure the lateral   aspect of the newly created sleeve stomach to the resected edge of the gastrocolic omentum in an effort   to maintain the continuity of the sleeve and prevent twisting.  I then turned my attention to the diaphragmatic   repair.  I then dissected out the hiatal hernia completely identifying both the right and left crura respectively.    I identified the hiatal hernia and it was approximately 2 cm in size.  In the process I obtained critical view   of the hiatal hernia obtaining 360 degree view of the region.  I then closed the hiatal hernia against   the esophageal wall using a single Ethibond sutures taking care not to encroach upon the esophagus itself. I then used Eviseal   along the entire staple line to obtain hemostasis and then place Surgicel Snow over the staple line also.   With all this having been completed, I then removed the liver retractor. I performed a liver wedge biopsy of   the left lobe of the liver due to its abnormality and submitted to pathology for permanent section. I then placed   a SWEETIE drain in the left upper quadrant region along the staple line. I removed the specimen from the operative   field via the LLQ incision. I closed the right lower quadrant and periumbilical trocar sites using a transabdominal #0 PDS   suture along the fascia, and all skin incisions were then closed using 4-0 subcuticular Monocryl. Steri-Strips   and sterile dressings were applied. The patient tolerated the procedure well.       Jonathan Barlow M.D.

## 2024-06-13 NOTE — PERIOP NOTE
TRANSFER - IN REPORT:    Verbal report received from OR Nurse and crna on Yugonda Sample-Jones  being received from OR for routine post-op      Report consisted of patient's Situation, Background, Assessment and   Recommendations(SBAR).     Information from the following report(s) Nurse Handoff Report, Adult Overview, Surgery Report, Intake/Output, MAR, and Med Rec Status was reviewed with the receiving nurse.    Opportunity for questions and clarification was provided.      Assessment completed upon patient's arrival to unit and care assumed.

## 2024-06-13 NOTE — ANESTHESIA POSTPROCEDURE EVALUATION
Post-Anesthesia Evaluation and Assessment    Cardiovascular Function/Vital Signs/Pain Score:  Vitals  BP: (!) 135/90  Temp: 97.5 °F (36.4 °C)  Temp Source: Skin  Pulse: 83  Respirations: 19  SpO2: 100 %  Height: 162.6 cm (5' 4.02\")  Weight - Scale: 129.2 kg (284 lb 12.8 oz)  Pain Level: 5    Patient is status post Procedure(s):  LAPAROSCOPIC SLEEVE GASTRECTOMY WITH POSSIBLE LIVER WEDGE BIOPSY.    Nausea/Vomiting: Controlled.    Postoperative hydration reviewed and adequate.    Pain:  Managed    Mental Status and Level of Consciousness: Baseline and appropriate for discharge.    Adequate oxygenation and airway patent.    Complications related to anesthesia: None    Post-anesthesia assessment completed. No concerns.    Patient has met all discharge requirements.    Signed By: Suraj Donnelly MD    June 13, 2024

## 2024-06-13 NOTE — NURSE NAVIGATOR
Patient's daughter at bedside.  Patient alert throughout visit.  Patient complaining of expected pain with mild nausea.      BP (!) 161/75   Pulse 91   Temp 97.7 °F (36.5 °C) (Oral)   Resp 16   Ht 1.626 m (5' 4.02\")   Wt 129.2 kg (284 lb 12.8 oz)   SpO2 98%   BMI 48.86 kg/m²     General: Alert & oriented to person, place, time, and situation  Abdomen: Appropriate tenderness, soft, non-distended  Lap sites: Within normal limits  SWEETIE Drain: Small amount of serosanguinous drainage  SCD's: In place and operating within normal limits    Plan:  -Continue medications for symptom management  -Encourage ambulation  -SCD use when in bed  -IS 10 times an hour  -NPO  -UGI in AM    Plan was discussed with the patient, as well as IS teaching provided.  Patient verbalized understanding to plan and education.  Patient completed IS x3 during this visit with no issues nor concerns noted by this RN.  She reached 2,000 mL.

## 2024-06-13 NOTE — PROGRESS NOTES
1220  Patient arrived to the unit. Handoff report given by FELISHA Leonardo.     1309  Patient resting in semi fowlers position responds to verbal cues, patients pain level is 5/10, did not verbally specify her tolerable level of pain due to some drowsiness. Ice has been applied to the abdomen for pain management. SEDs are in place bilaterally, NOLAN hose have been applied. Patient got 1500 on IS.  Bed is in the lowest position, call bell is within reach, safety measures are in tact. Possessions and bedside table are within reach.    1405  Patient resting in bed, Patient ambulated hallways per FELISHA Ramsey. Patient voided in hat 300ml clear, yellow urine. Pain level 4/10 which is tolerable for her at this time. Bed is in the lowest position, call bell is within reach.    1457  Pain assessment done patient asked for pain medication for moderate pain 5/10. Patient was administered PRN pain meds per MAR.     1610  Patient pain reassessed pain is a 3/10 which is her tolerable pain level.  Bed is in lowest position, call bell is within reach.    1615  FELISHA Esteves was notified of patient's blood pressure and new orders were implemented.     1632  Order placed for Lasix 20mg IV once, modified continous fluids to 50ml/hr. Per Linn HWANG RN who spoke with MD Barlow.V.O. RBV     1820  Patient is up walking the hallways.     1830-  Patient experienced N/V given PRN nausea med per MAR orders. Pain is 3/10 pain is at tolerable pain level.

## 2024-06-13 NOTE — CARE COORDINATION
Discharge Plan: Home      CM NW made aware pt admitted to the floor s/p laparoscopic sleeve gastrectomy. Pt is independent with ADLS at baseline and has [facility] Medicaid insurance. No discharge needs identified. CM available if discharge needs arise.

## 2024-06-13 NOTE — PERIOP NOTE
TRANSFER - IN REPORT:    Verbal report received from Roxy BAEZA on Yugonda Sample-Jones  being received from [address] [place] [address] for routine post-op      Report consisted of patient's Situation, Background, Assessment and   Recommendations(SBAR).     Information from the following report(s) Nurse Handoff Report, Adult Overview, Surgery Report, Intake/Output, MAR, and Med Rec Status was reviewed with the receiving nurse.    Opportunity for questions and clarification was provided.      Assessment completed upon patient's arrival to unit and care assumed.

## 2024-06-13 NOTE — PERIOP NOTE
Spoke to Dr Mason regarding patient BP still being elevated 132/104. He advised another dose of labetalol 10mg. He also spoke to Dr. Barlow for a clonidine .3mg patch, which he said okay to order for patient.

## 2024-06-13 NOTE — INTERVAL H&P NOTE
Update History & Physical    The patient's History and Physical of Luiza 3, 2024 was reviewed with the patient and I examined the patient. There was no change. The surgical site was confirmed by the patient and me.     Plan: The risks, benefits, expected outcome, and alternative to the recommended procedure have been discussed with the patient. Patient understands and wants to proceed with the procedure.     Electronically signed by Jonathan Barlow MD on 6/13/2024 at 7:09 AM

## 2024-06-14 ENCOUNTER — APPOINTMENT (OUTPATIENT)
Facility: HOSPITAL | Age: 44
End: 2024-06-14
Attending: SPECIALIST
Payer: MEDICAID

## 2024-06-14 VITALS
SYSTOLIC BLOOD PRESSURE: 148 MMHG | WEIGHT: 284.8 LBS | BODY MASS INDEX: 48.62 KG/M2 | TEMPERATURE: 98.8 F | OXYGEN SATURATION: 99 % | RESPIRATION RATE: 14 BRPM | DIASTOLIC BLOOD PRESSURE: 72 MMHG | HEART RATE: 94 BPM | HEIGHT: 64 IN

## 2024-06-14 PROCEDURE — 2500000003 HC RX 250 WO HCPCS: Performed by: SPECIALIST

## 2024-06-14 PROCEDURE — 6360000002 HC RX W HCPCS: Performed by: SPECIALIST

## 2024-06-14 PROCEDURE — BD15YZZ FLUOROSCOPY OF UPPER GI USING OTHER CONTRAST: ICD-10-PCS | Performed by: SPECIALIST

## 2024-06-14 PROCEDURE — 6370000000 HC RX 637 (ALT 250 FOR IP): Performed by: SPECIALIST

## 2024-06-14 PROCEDURE — 6360000004 HC RX CONTRAST MEDICATION: Performed by: SPECIALIST

## 2024-06-14 PROCEDURE — 2580000003 HC RX 258: Performed by: SPECIALIST

## 2024-06-14 PROCEDURE — 74240 X-RAY XM UPR GI TRC 1CNTRST: CPT

## 2024-06-14 RX ORDER — OXYCODONE HYDROCHLORIDE AND ACETAMINOPHEN 5; 325 MG/1; MG/1
1 TABLET ORAL EVERY 4 HOURS PRN
Status: DISCONTINUED | OUTPATIENT
Start: 2024-06-14 | End: 2024-06-14 | Stop reason: HOSPADM

## 2024-06-14 RX ORDER — ACETAMINOPHEN 325 MG/1
650 TABLET ORAL EVERY 6 HOURS PRN
Status: DISCONTINUED | OUTPATIENT
Start: 2024-06-14 | End: 2024-06-14 | Stop reason: HOSPADM

## 2024-06-14 RX ADMIN — SIMETHICONE 80 MG: 80 TABLET, CHEWABLE ORAL at 08:24

## 2024-06-14 RX ADMIN — MORPHINE SULFATE 6 MG: 10 INJECTION INTRAVENOUS at 04:42

## 2024-06-14 RX ADMIN — ONDANSETRON 4 MG: 2 INJECTION INTRAMUSCULAR; INTRAVENOUS at 08:25

## 2024-06-14 RX ADMIN — BARIUM SULFATE 20 G: 960 POWDER, FOR SUSPENSION ORAL at 07:23

## 2024-06-14 RX ADMIN — KETOROLAC TROMETHAMINE 30 MG: 30 INJECTION, SOLUTION INTRAMUSCULAR; INTRAVENOUS at 03:15

## 2024-06-14 RX ADMIN — SODIUM CHLORIDE, PRESERVATIVE FREE 10 ML: 5 INJECTION INTRAVENOUS at 08:27

## 2024-06-14 RX ADMIN — KETOROLAC TROMETHAMINE 30 MG: 30 INJECTION, SOLUTION INTRAMUSCULAR; INTRAVENOUS at 08:25

## 2024-06-14 RX ADMIN — SIMETHICONE 80 MG: 80 TABLET, CHEWABLE ORAL at 04:03

## 2024-06-14 RX ADMIN — DIATRIZOATE MEGLUMINE AND DIATRIZOATE SODIUM 20 ML: 660; 100 LIQUID ORAL; RECTAL at 07:23

## 2024-06-14 RX ADMIN — ENOXAPARIN SODIUM 40 MG: 100 INJECTION SUBCUTANEOUS at 08:24

## 2024-06-14 ASSESSMENT — PAIN DESCRIPTION - ORIENTATION: ORIENTATION: LEFT

## 2024-06-14 ASSESSMENT — PAIN DESCRIPTION - LOCATION: LOCATION: ABDOMEN

## 2024-06-14 ASSESSMENT — PAIN DESCRIPTION - PAIN TYPE: TYPE: SURGICAL PAIN

## 2024-06-14 ASSESSMENT — PAIN SCALES - GENERAL
PAINLEVEL_OUTOF10: 6
PAINLEVEL_OUTOF10: 0
PAINLEVEL_OUTOF10: 5
PAINLEVEL_OUTOF10: 4

## 2024-06-14 ASSESSMENT — PAIN - FUNCTIONAL ASSESSMENT: PAIN_FUNCTIONAL_ASSESSMENT: ACTIVITIES ARE NOT PREVENTED

## 2024-06-14 ASSESSMENT — PAIN DESCRIPTION - FREQUENCY: FREQUENCY: CONTINUOUS

## 2024-06-14 ASSESSMENT — PAIN DESCRIPTION - DESCRIPTORS: DESCRIPTORS: SHARP

## 2024-06-14 ASSESSMENT — PAIN DESCRIPTION - ONSET: ONSET: ON-GOING

## 2024-06-14 NOTE — DISCHARGE INSTRUCTIONS
[facility]  Jonathan Barlow M.D., F.A.C.S.  [address]  [address]  [place]   [address]  Office: [phone]    Fax:  [phone]    Discharge Instruction for Gastric Bypass / Sleeve Gastrectomy Patients    Diet:  Continue with the liquid diet until you are seen in the office.  Make sure you sip fluids all day. Goal for total fluid intake is at least 64 ounces per day. Aim for  Gms of protein every day.    Activity:  Walking is encouraged.  Rest when you are tired.  You may shower.  You may climb stairs.  Take your time.  No lifting more than 10-15 lbs.  If you feel discomfort during an activity, rest.  Do not drive for 1 week or until off of all narcotics.     Wound Care:  Clean incisions with soap and water when in the shower. Pat dry.  Leave steri-strips on until they fall off.  A small amount of drainage may be present from the incisions.  Contact the office if you notice an increase in drainage, an odor, increased redness, or fever > 100.5.    Medications:  You will receive a prescription for pain medication and Prilosec at the time of discharge.  For upset stomach you may take over the counter medications such as Maalox, Mylanta, or Pepto Bismol.  Gas X works very well for bloating when eating or drinking  You may take Tylenol  Non-aspirin based arthritis medications may be resumed   Take a children’s or adult chewable multivitamin.  Milk of Magnesia will help with constipation.  It is fine to take your usual home medications.  Blood pressure medications should be continued after surgery.  Diabetic medications can frequently be reduced very quickly after surgery.  Diabetics should continue to monitor blood sugars frequently after surgery and contact the prescribing physician for any questions.    Follow-Up Appointment:  If you do not already have a follow-up appointment scheduled, contact the office in the next few days to obtain one.  It is usually scheduled for

## 2024-06-14 NOTE — PROGRESS NOTES
1919 - Assumed care at this time.     1939 - Patient A&Ox4, RA. Denies chest pain and SOB. Pt getting up to 1500 on IS. Denies nausea/ denies vomiting. Denies flatus, pt is belching. SCD compression device and TEDs bilaterally. x5 lap sites with gauze/tape dressings to abdomen C/D/I. SWEETIE draining and patent. Denies numbness/tingling/calf pain.  Pain 3/10 with a tolerable level of 4/10. Pt educated on IS use, diet, q2h rounds, importance of ambulation, pain management, and 5am being the last dose of narcotics prior to GI study. Pt verbalized understanding, no concerns voiced. Call bell within reach, bed in lowest position. Pt encouraged to call for assistance via call bell/zone phone.    0308 - Pt ambulated to BR and has been ambulating in room throughout the night.    0320 - Pt ambulating in hallway.

## 2024-06-14 NOTE — DISCHARGE SUMMARY
Discharge Summary    Patient: Yugonda Sample-Jones               Sex: female          DOA: 6/13/2024         YOB: 1980      Age:  43 y.o.        LOS:  LOS: 1 day                Admit Date: 6/13/2024    Discharge Date: 6/14/2024    Admission Diagnoses: Morbid obesity (HCC) [E66.01]  Body mass index 45.0-49.9, adult (HCC) [Z68.42]  Morbid obesity with body mass index (BMI) of 40.0 to 49.9 (HCC) [E66.01]    Discharge Diagnoses:      Discharge Condition: Good    Hospital Course: The patient underwent sleeve resection  on 6/13/2024. The patient tolerated the procedure well. Vital signs remained stable and the patient was transferred to  3rd floor surgical unit without complications. The patient remained stable throughout the first night post operatively with stable vital signs and adequate urine output and pain control. Pain was controlled with Dilaudid IV and IV Tylenol .  The patient on the first morning post operative was transferred to the radiology suite where they underwent a gastrograffin UGI study which showed no evidence of a leak or stricture. The drain was discontinued on POD # 1 and the patient was started on a bariatric liquid diet with protein shakes. The patient progressed throughout the day and was ambulating well and tolerating their diet. They were therefore discharged home with instructions to notify me with any issues that may arise.    Significant Diagnostic Studies:   No results for input(s): \"HGB\" in the last 72 hours.  No results for input(s): \"HCT\" in the last 72 hours.       Medication List        CONTINUE taking these medications      Flintstones Complete 18 MG Chew     losartan-hydroCHLOROthiazide 50-12.5 MG per tablet  Commonly known as: HYZAAR            ASK your doctor about these medications      enoxaparin 40 MG/0.4ML  Commonly known as: LOVENOX  Inject 0.4 milliliters subcutaneously every 12 hours     ondansetron 8 MG Tbdp disintegrating tablet  Commonly known as:

## 2024-06-14 NOTE — PROGRESS NOTES
0734  Change of shift report received from FELISHA Myers    0822  Patient AAOx4. PIV is C/D/I with fluids infusing, no s/s of infiltration or phlebitis. VSS on RA. Breath sounds clear bilaterally. Patient reaching 2500 on IS. Gauze dressing to 5 abdominal lap sites are C/D/I. SWEETIE drain charged and patent, dressing is C/D/I. Patient not passing flatus. SCDs and TEDs intact to BLE. No other concerns at this time. Possessions within reach, will continue to monitor.      0843  40mL serosanguinous fluid emptied from SWEETIE drain.  SWEETIE drain removed, catheter tip intact upon removal. Covered with gauze and tape.   Bariatric hydration education reviewed with patient. Patient verbalizes and demonstrates understanding. No further questions at this time.     1210  Patient ambulating in hallways around nurses station    1251  Patient averaging 5oz/hr on hydration sheet. Patient took a nap at 10:00 and only hydrated 2oz. Patient encouraged to stay awake once home in order to achieve hydration goal. Patient verbalizes understanding.   200mL urine emptied from patient hat in toilet.   AVS meds reviewed with FELISHA Quiroz.   Discharge paperwork reviewed with patient, all questions answered. No other concerns at this time.   PIV removed, catheter tip intact upon removal. Covered with gauze and tape.

## 2024-06-14 NOTE — PROGRESS NOTES
Bariatric Surgery                POD #1    BP (!) 140/73   Pulse 100   Temp 98.8 °F (37.1 °C) (Oral)   Resp 16   Ht 1.626 m (5' 4.02\")   Wt 129.2 kg (284 lb 12.8 oz)   SpO2 97%   BMI 48.86 kg/m²   Patient has minimal complaints of pain, minimal nausea noted     Exam:  Appears well in no distress  Lungs- clear bilaterally  Abd - soft, incisions look good without erythema           SWEETIE with minimal serosanguinous output  Extremities- no new edema or swelling    UGI - no obstrustion or leak    Data Review:    Labs: Results:       Chemistry No results for input(s): \"GLU\", \"NA\", \"K\", \"CL\", \"CO2\", \"BUN\", \"TP\", \"GLOB\" in the last 72 hours.    Invalid input(s): \"CREA\", \"CA\", \"AGAP\", \"BUCR\", \"TBIL\", \"GPT\", \"AP\", \"ALB\", \"AGRAT\"   CBC w/Diff No results for input(s): \"WBC\", \"RBC\", \"HGB\", \"HCT\", \"PLT\", \"RETIC\" in the last 72 hours.    Invalid input(s): \"GRANS\", \"LYMPH\", \"EOS\"   Coagulation No results for input(s): \"INR\", \"APTT\" in the last 72 hours.    Invalid input(s): \"PTP\"    Liver Enzymes No results for input(s): \"TP\" in the last 72 hours.    Invalid input(s): \"ALB\", \"TBIL\", \"AP\", \"SGOT\", \"GPT\", \"DBIL\"       Assessment/Plan: S/P   laparoscopic sleeve gastrectomy - doing well without any issues    1.Start bariatric diet and protein shakes  2.D/C IV pain meds and start PO pain meds  3.D/C SWEETIE drain  4.Likley PM D/C if  Cont ok and tolerate PO

## 2024-06-14 NOTE — PROCEDURES
[facility]    Upper GI Procedure Report      Yugonda Sample-Jones    Medical Record Number:[id number]    1980    Date of Service - June 14, 2024    Pre-Op Diagnosis - patient is status post sleeve resection performed by myself 24 hours ago. They now present for UGI to assess their post surgical anatomy.    Post-Op Diagnosis -same    Procedure - UGI study with gastrografin    Surgeon - Jonathan Barlow MD    Assistant - None    Complications - None    Specimens - None    Implants - None    Estimate Blood Loss - None    Statement of Medical Necessity - Need for radiologic evaluation prior to further management of their care..    Procedure -the patient was brought to the fluoroscopy suite where they were given gastrografin.  On swallowing the contrast the patient was noted to have normal peristalsis of their esophagus with progressive flow into the distal esophagus.  Specific findings of the distal esophagus revealed that they did not have a hiatal hernia. Contrast flowed normally through the esophagus and into a properly sized sleeve stomach without reflux or obstruction or signs of stricture. The stomach filled in a timely manner and emptied into the duodenum without issue or hesitation. The anatomy was normal for the timeframe with no stricture or obstruction or any other abnormality.  Given the benign findings of today's exam we will proceed with liquid diet and start PO medications.    Jonathan Barlow MD

## 2024-06-18 ENCOUNTER — TELEPHONE (OUTPATIENT)
Age: 44
End: 2024-06-18

## 2024-06-18 NOTE — TELEPHONE ENCOUNTER
This LPN spoke with patient post-operatively.      Hydration: Patient has hydrated with 72 ounces as yesterday. Thus far today, she has hydrated with 6 ounces as of 9:00 am.     Nausea and/or vomiting: None     Pain: Currently managed without medication. Patient is taking Gas X for gassiness; it is effective.      Lovenox injections: Administering every 12 hours, rotating sites.  LPN reminded patient to complete all injections, in which patient verbalized understanding     Lap sites: Remove gauze and assess, No erythema, drainage, and/or swelling     SWEETIE drain site: No drainage; dressing being changed daily     BM: Daily     Ambulation: Patient is walking throughout house every hour.     IS: did not bring from hospital, but doing deep breathing exercises.     Temperature: 97.9 degrees     Medications: (confirmed currently taking)              *Multi-vitamin: yes              *Probiotic: yes              *Prilosec: yes   Questions: None    This LPN reminded the patient to contact the office with any questions and/or concerns.  Patient verbalized understanding.  Patient's two week post-op visit is scheduled and was confirmed.

## 2024-06-27 ENCOUNTER — OFFICE VISIT (OUTPATIENT)
Age: 44
End: 2024-06-27

## 2024-06-27 ENCOUNTER — HOSPITAL ENCOUNTER (OUTPATIENT)
Facility: HOSPITAL | Age: 44
Discharge: HOME | End: 2024-06-30

## 2024-06-27 VITALS
OXYGEN SATURATION: 100 % | BODY MASS INDEX: 46.04 KG/M2 | SYSTOLIC BLOOD PRESSURE: 125 MMHG | WEIGHT: 269.7 LBS | HEART RATE: 90 BPM | DIASTOLIC BLOOD PRESSURE: 78 MMHG | HEIGHT: 64 IN | TEMPERATURE: 97.1 F

## 2024-06-27 DIAGNOSIS — Z09 POSTOPERATIVE EXAMINATION: Primary | ICD-10-CM

## 2024-06-27 PROCEDURE — 99024 POSTOP FOLLOW-UP VISIT: CPT | Performed by: NURSE PRACTITIONER

## 2024-06-27 RX ORDER — MULTIVIT-MIN/IRON/FOLIC ACID/K 45-800-120
CAPSULE ORAL
COMMUNITY

## 2024-06-27 RX ORDER — URSODIOL 500 MG/1
500 TABLET, FILM COATED ORAL DAILY
Qty: 30 TABLET | Refills: 4 | Status: SHIPPED | OUTPATIENT
Start: 2024-06-27

## 2024-06-27 RX ORDER — OMEPRAZOLE 20 MG/1
20 CAPSULE, DELAYED RELEASE ORAL DAILY
COMMUNITY

## 2024-06-27 NOTE — PROGRESS NOTES
Subjective:      Yugonda Sample-Jones is a 43 y.o. female is now 2 weeks status post laparoscopic sleeve gastrectomy with diaphragmatic hernia repair. Doing well overall.  She has lost a total of 20 pounds since surgery.  Body mass index is 46.26 kg/m². Currently on a liquid diet without difficulty. Taking in 64+ oz fluid daily.  Sources of protein include protein shakes. No structured exercise, but increasing activity.   Bowel movements are regular. The patient is not having any pain.. The patient is  compliant with multivitamins.   Surgery related complications: NA.  Liver bx report reviewed with patient.  Stomach bx report reviewed with patient.        6/27/2024     1:42 PM 6/27/2024     1:38 PM 6/14/2024    12:35 PM 6/14/2024     8:22 AM 6/14/2024     4:40 AM 6/14/2024     3:12 AM 6/13/2024    10:38 PM   Ambulatory Bariatric Summary   Systolic 125 149 148 137  140 127   Diastolic 78 108 72 83  73 73   Pulse  90 94 91  100 103   Temp  97.1 °F (36.2 °C) 98.8 °F (37.1 °C) 98.5 °F (36.9 °C)  98.8 °F (37.1 °C) 98.5 °F (36.9 °C)   Respirations   14 14 16 17 16   Weight - Scale  269.7        Height  1.626 m (5' 4.02\")        BMI  46.4 kg/m2        Weight - Scale  122.3 kg (269 lb 11.2 oz)        BMI (Calculated)  46.4               Comorbidities:      Hypertension: improved, off Rx  Diabetes: N/A  Obstructive Sleep Apnea: N/A   Hyperlipidemia: N/A  Stress Urinary Incontinence: N/A  Gastroesophageal Reflux:N/A  Weight related arthropathy:N/A    Denies diagnosis of COVID-19 since surgery.     Patient Active Problem List   Diagnosis    Morbid obesity (HCC)    Morbid obesity with body mass index (BMI) of 40.0 to 49.9 (HCC)    Functional dyspepsia    H. pylori infection    Marijuana use    Superficial gastritis without hemorrhage    Gastric wall thickening    NAFLD (nonalcoholic fatty liver disease)        Past Medical History:   Diagnosis Date    Functional dyspepsia 2023    avoids trigger foods, not treated with med    H.

## 2024-06-28 NOTE — PROGRESS NOTES
Post-Op Nutrition Note  [facility]      Patient's Name: Yugonda Sample-Jones Age: 43 y.o.   YOB: 1980 Sex: female     Reviewed soft/moist puree diet progression for post-op weeks 3-4, directing patient to pg. 44-49 of the bariatric surgery education book.  Discussed post-op diet progression to soft/moist pureed phase.  Diet is high-protein, low-fat, and low-carbohydrate.  Reviewed appropriate food choices and portion sizes, maintaining adequate hydration, and  food/fluid intake by following the 30:30 rule.  Additionally we discussed eating behavior modifications, meal adaptations (use of smaller dishes/utensils, eating slowly and chewing sufficiently for digestion, moist cooking techniques, and eating behavior modifications.  Patient was instructed to consume 3 meals and 2-3 protein supplements between meals daily.  Proper meal timing reviewed to include having first meal within 2 hours of waking and eating a meal or snack every 3-4 hrs up until 2 hrs before bedtime.  Reinforced the importance of continuing chewable multivitamin & probiotic, at least 64 oz/d sugar-free/caffeine-free/non-carbonated fluids, and  g/d protein.  Stressed the importance of aiming for at least 150 min/wk of physical activity each day, increasing intensity as tolerated, with restricted lifting, to facilitate desired weight loss.        Pt voiced understanding through repeating the information back to me.  Patient's nutrition-related questions answered.  Reminded pt of their 2 week post-op medical appointment.  Additionally, I reminded pt that I would be calling them again at 1 mo. post-op.      RON DAVIS RD

## 2024-07-16 ENCOUNTER — HOSPITAL ENCOUNTER (OUTPATIENT)
Facility: HOSPITAL | Age: 44
Discharge: HOME | End: 2024-07-19

## 2024-07-16 VITALS — HEIGHT: 64 IN | BODY MASS INDEX: 45.07 KG/M2 | WEIGHT: 264 LBS

## 2024-07-16 NOTE — PROGRESS NOTES
150 min/wk moderate to vigorous intensity activity recommended.  Encouraged to attend monthly support group  Start Ursodiol, as directed at 2 wk post-op provider visit; take until all refills completed    I have discussed this plan with patient, and they verbalized understanding.  All stated nutrition-related questions/concerns addressed.  Refer to the surgery handbook previously provided and the 1-6 mo. post-op nutrition education handouts/video for further reference, which were emailed to patient along with support group flyer.  RD contact information provided for future nutrition-related questions/concerns.  Reminded pt of their next scheduled post-op provider visit and to follow-up sooner if patient has questions, concerns, or worsening of condition.  If unable to reach our office, patient advised to report to the ED.       RON DAVIS RD

## 2024-10-01 ENCOUNTER — OFFICE VISIT (OUTPATIENT)
Age: 44
End: 2024-10-01
Payer: MEDICAID

## 2024-10-01 VITALS
HEIGHT: 64 IN | HEART RATE: 86 BPM | SYSTOLIC BLOOD PRESSURE: 135 MMHG | OXYGEN SATURATION: 100 % | DIASTOLIC BLOOD PRESSURE: 79 MMHG | BODY MASS INDEX: 41.93 KG/M2 | TEMPERATURE: 96.8 F | WEIGHT: 245.6 LBS

## 2024-10-01 DIAGNOSIS — K90.9 INTESTINAL MALABSORPTION, UNSPECIFIED TYPE: Primary | ICD-10-CM

## 2024-10-01 DIAGNOSIS — Z98.84 S/P LAPAROSCOPIC SLEEVE GASTRECTOMY: ICD-10-CM

## 2024-10-01 DIAGNOSIS — K90.9 INTESTINAL MALABSORPTION, UNSPECIFIED TYPE: ICD-10-CM

## 2024-10-01 PROCEDURE — 99214 OFFICE O/P EST MOD 30 MIN: CPT | Performed by: NURSE PRACTITIONER

## 2024-10-01 NOTE — PATIENT INSTRUCTIONS
Baritastic or My Fitness Pal Najma  80-90g protein  800-1000 calories   Keep carbs below 50g       Start B complex - B50 or B100    Patient Instructions      Remember hydration goals - minimum of 64 ounces of liquids per day (dehydration is the number one reason for hospital readmission).  Continue to monitor carbohydrate and protein intake you need a minimum of  Grams of protein daily- remember to keep your total carbohydrates to 50 grams or less per day for best results.  Continue to work towards exercise goals - 60-90 minutes, 5 times a week minimum of deliberate, aerobic exercise is the ultimate goal with strength training 2 times each week. Refer to [employer] for  information.  Remember to take vitamins as directed.  Attend support group the 2nd Thursday of each month.  6.  Constipation: Milk of Magnesia is for immediate relief only.  Miralax is to be used every day if constipation is a chronic problem.    7.  Diarrhea: patients will occasionally develop lactose intolerance after surgery.  Check to see if your protein shake has whey in it.  If it does try a protein powder or drink that does not have whey and stop all yogurts, cheeses and milks to see if the diarrhea goes away.    8.  If you have had labs drawn.  We will only call you if you have abnormal results.  Otherwise you can access the lab results in \"[employer]\".  You will only need the access code the first time you sign on.    9.  Call us at [phone] or email us through \"[employer]\" with questions,     concerns or worsening of condition, we have someone on call 24 hours a day.  If you are unable to reach our office, you are to go to your Primary Care Physician or the Emergency Department.     NOTE TO GASTRIC BYPASS PATIENTS:  (SAME APPLIES TO GASTRIC SLEEVE PATIENTS FOR FIRST TWO MONTHS)  Remember that for the rest of your life, you are not able to take the following:  - NSAIDs (ibuprofen, goody powder, BC powder, Motrin,

## 2024-10-01 NOTE — PROGRESS NOTES
Subjective:      Yugonda Sample-Jones is a 44 y.o. female is now 3 months status post laparoscopic sleeve gastrectomy. Doing well overall.  She has lost a total of 44 pounds since surgery.  Body mass index is 42.16 kg/m².  Has lost 28% of EBW.    Currently on a solid food diet without difficulty, reports no real issues and denies vomiting, nausea, abdominal pain, difficulty swallowing, and reflux.      The patients diet choices have been reviewed today and counseling was given.      Fluid intake: 80 oz      Protein intake: 1 shake and 1 protein pudding daily + food; chicken, fish, seafood      Meals/day: 1     No structured exercise, but increasing activity.    Bowel movements are regular. The patient is not having any pain.. The patient is  compliant with multivitamins, calcium, Vit D and B12 supplements.         10/1/2024     8:02 AM 10/1/2024     7:57 AM 7/16/2024     2:30 PM 6/27/2024     1:42 PM 6/27/2024     1:38 PM 6/14/2024    12:35 PM 6/14/2024     8:22 AM   Ambulatory Bariatric Summary   Systolic 135 138  125 149 148 137   Diastolic 79 108  78 108 72 83   Pulse  86   90 94 91   Temp  96.8 °F (36 °C)   97.1 °F (36.2 °C) 98.8 °F (37.1 °C) 98.5 °F (36.9 °C)   Respirations      14 14   Weight - Scale  245.6 264  269.7     Height  1.626 m (5' 4\") 1.626 m (5' 4\")  1.626 m (5' 4.02\")     BMI  42.2 kg/m2 45.4 kg/m2  46.4 kg/m2     Weight - Scale  111.4 kg (245 lb 9.6 oz) 119.7 kg (264 lb)  122.3 kg (269 lb 11.2 oz)     BMI (Calculated)  42.2 45.4  46.4            Comorbidities:    Hypertension: improved, off Rx  Diabetes: N/A  Obstructive Sleep Apnea: N/A   Hyperlipidemia: N/A  Stress Urinary Incontinence: N/A  Gastroesophageal Reflux:N/A  Weight related arthropathy:N/A       Patient Active Problem List   Diagnosis    Morbid obesity    Morbid obesity with body mass index (BMI) of 40.0 to 49.9    Functional dyspepsia    H. pylori infection    Marijuana use    Superficial gastritis without hemorrhage    Gastric wall

## 2025-01-28 ENCOUNTER — OFFICE VISIT (OUTPATIENT)
Age: 45
End: 2025-01-28
Payer: MEDICAID

## 2025-01-28 ENCOUNTER — HOSPITAL ENCOUNTER (OUTPATIENT)
Facility: HOSPITAL | Age: 45
Setting detail: SPECIMEN
Discharge: HOME | End: 2025-01-31

## 2025-01-28 VITALS
BODY MASS INDEX: 38.79 KG/M2 | HEART RATE: 80 BPM | WEIGHT: 227.2 LBS | SYSTOLIC BLOOD PRESSURE: 143 MMHG | HEIGHT: 64 IN | DIASTOLIC BLOOD PRESSURE: 108 MMHG | OXYGEN SATURATION: 99 % | TEMPERATURE: 97.8 F

## 2025-01-28 DIAGNOSIS — K90.9 INTESTINAL MALABSORPTION, UNSPECIFIED TYPE: Primary | ICD-10-CM

## 2025-01-28 DIAGNOSIS — I10 HYPERTENSION, UNSPECIFIED TYPE: ICD-10-CM

## 2025-01-28 DIAGNOSIS — Z98.84 S/P LAPAROSCOPIC SLEEVE GASTRECTOMY: ICD-10-CM

## 2025-01-28 PROBLEM — K30 FUNCTIONAL DYSPEPSIA: Status: RESOLVED | Noted: 2024-03-08 | Resolved: 2025-01-28

## 2025-01-28 PROBLEM — E66.01 MORBID OBESITY: Status: RESOLVED | Noted: 2024-03-08 | Resolved: 2025-01-28

## 2025-01-28 PROBLEM — E66.01 MORBID OBESITY WITH BODY MASS INDEX (BMI) OF 40.0 TO 49.9: Status: RESOLVED | Noted: 2024-03-08 | Resolved: 2025-01-28

## 2025-01-28 PROBLEM — A04.8 H. PYLORI INFECTION: Status: RESOLVED | Noted: 2024-03-08 | Resolved: 2025-01-28

## 2025-01-28 LAB — LABCORP SPECIMEN COLLECTION: NORMAL

## 2025-01-28 PROCEDURE — 3077F SYST BP >= 140 MM HG: CPT | Performed by: NURSE PRACTITIONER

## 2025-01-28 PROCEDURE — 99001 SPECIMEN HANDLING PT-LAB: CPT

## 2025-01-28 PROCEDURE — 99214 OFFICE O/P EST MOD 30 MIN: CPT | Performed by: NURSE PRACTITIONER

## 2025-01-28 PROCEDURE — 3080F DIAST BP >= 90 MM HG: CPT | Performed by: NURSE PRACTITIONER

## 2025-01-28 RX ORDER — LOSARTAN POTASSIUM AND HYDROCHLOROTHIAZIDE 12.5; 5 MG/1; MG/1
1 TABLET ORAL EVERY MORNING
Qty: 30 TABLET | Refills: 0 | Status: SHIPPED | OUTPATIENT
Start: 2025-01-28

## 2025-01-28 NOTE — PROGRESS NOTES
, history of morbid obesity. Reviewed weight loss progress and has achieved 39% EBWL at nearly  months postop, which is slightly below expected amount. Do recommend using food tracking karsten to ensure adequate protein and caloric intake. Aim for 80-90 g protein daily and 800-1000 calories. Keep daily carbs below 50g. Has been drinking juices after going to gym and warned of hidden carbs.    Sleep goal is 7-9 hours each night. Patient education given on the effects of sleep deprivation on weight control.  Discussed patients weight loss goals and dietary choices in relation to goals.  Reminded to measure portions, continue high protein, low carbohydrate diet.  Reminded to eat regularly, to eat slowly & not to drink with meals.    Continue cardio exercise and add resistance exercises. 60-90 minutes of aerobic activity 5 days a week and strength training 2 days each week.  Encouraged to attend support group   Required fluid intake is >64oz daily of decaffeinated sugar free beverages.   3. HTN - uncontrolled, needs to resume Rx, sent 30 day supply and is aware to f/up with PCP    Patient to complete labs before next visit.  Lab slip given today.  I have discussed this plan with patient and they verbalized understanding    30 minutes spent with patient    Follow up in 3 months or sooner if patient has questions, concerns or worsening of condition, if unable to reach our office, patient should report to the ED.    Ms. Merchant has a reminder for a \"due or due soon\" health maintenance. I have asked that she contact her primary care provider for a follow-up on this health maintenance.

## 2025-01-28 NOTE — PATIENT INSTRUCTIONS
Baritastic or My Fitness Pal Najma  80-90g protein  800-1000 calories   Keep total carbs below 50g     Restart B complex or B50  Start calcium chews, at least once day      Patient Instructions      Remember hydration goals - minimum of 64 ounces of liquids per day (dehydration is the number one reason for hospital readmission).  Continue to monitor carbohydrate and protein intake you need a minimum of  Grams of protein daily- remember to keep your total carbohydrates to 50 grams or less per day for best results.  Continue to work towards exercise goals - 60-90 minutes, 5 times a week minimum of deliberate, aerobic exercise is the ultimate goal with strength training 2 times each week. Refer to [employer] for  information.  Remember to take vitamins as directed.  Attend support group the 2nd Thursday of each month.  6.  Constipation: Milk of Magnesia is for immediate relief only.  Miralax is to be used every day if constipation is a chronic problem.    7.  Diarrhea: patients will occasionally develop lactose intolerance after surgery.  Check to see if your protein shake has whey in it.  If it does try a protein powder or drink that does not have whey and stop all yogurts, cheeses and milks to see if the diarrhea goes away.    8.  If you have had labs drawn.  We will only call you if you have abnormal results.  Otherwise you can access the lab results in \"[employer]\".  You will only need the access code the first time you sign on.    9.  Call us at [phone] or email us through \"[employer]\" with questions,     concerns or worsening of condition, we have someone on call 24 hours a day.  If you are unable to reach our office, you are to go to your Primary Care Physician or the Emergency Department.     NOTE TO GASTRIC BYPASS PATIENTS:  (SAME APPLIES TO GASTRIC SLEEVE PATIENTS FOR FIRST TWO MONTHS)  Remember that for the rest of your life, you are not able to take the following:  - NSAIDs

## 2025-01-29 LAB
25(OH)D3+25(OH)D2 SERPL-MCNC: 11.9 NG/ML (ref 30–100)
ALBUMIN SERPL-MCNC: 4 G/DL (ref 3.9–4.9)
ALP SERPL-CCNC: 94 IU/L (ref 44–121)
ALT SERPL-CCNC: 8 IU/L (ref 0–32)
AST SERPL-CCNC: 10 IU/L (ref 0–40)
BASOPHILS # BLD AUTO: 0.1 X10E3/UL (ref 0–0.2)
BASOPHILS NFR BLD AUTO: 2 %
BILIRUB SERPL-MCNC: 0.3 MG/DL (ref 0–1.2)
BUN SERPL-MCNC: 5 MG/DL (ref 6–24)
BUN/CREAT SERPL: 6 (ref 9–23)
CALCIUM SERPL-MCNC: 9.4 MG/DL (ref 8.7–10.2)
CHLORIDE SERPL-SCNC: 105 MMOL/L (ref 96–106)
CO2 SERPL-SCNC: 23 MMOL/L (ref 20–29)
CREAT SERPL-MCNC: 0.9 MG/DL (ref 0.57–1)
EGFRCR SERPLBLD CKD-EPI 2021: 80 ML/MIN/1.73
EOSINOPHIL # BLD AUTO: 0.2 X10E3/UL (ref 0–0.4)
EOSINOPHIL NFR BLD AUTO: 5 %
ERYTHROCYTE [DISTWIDTH] IN BLOOD BY AUTOMATED COUNT: 13.7 % (ref 11.7–15.4)
FERRITIN SERPL-MCNC: 90 NG/ML (ref 15–150)
FOLATE SERPL-MCNC: 3.4 NG/ML
GLOBULIN SER CALC-MCNC: 3.2 G/DL (ref 1.5–4.5)
GLUCOSE SERPL-MCNC: 89 MG/DL (ref 70–99)
HCT VFR BLD AUTO: 48.2 % (ref 34–46.6)
HGB BLD-MCNC: 15.5 G/DL (ref 11.1–15.9)
IMM GRANULOCYTES # BLD AUTO: 0 X10E3/UL (ref 0–0.1)
IMM GRANULOCYTES NFR BLD AUTO: 0 %
IRON SERPL-MCNC: 48 UG/DL (ref 27–159)
LYMPHOCYTES # BLD AUTO: 1.8 X10E3/UL (ref 0.7–3.1)
LYMPHOCYTES NFR BLD AUTO: 46 %
MCH RBC QN AUTO: 31.6 PG (ref 26.6–33)
MCHC RBC AUTO-ENTMCNC: 32.2 G/DL (ref 31.5–35.7)
MCV RBC AUTO: 98 FL (ref 79–97)
MONOCYTES # BLD AUTO: 0.3 X10E3/UL (ref 0.1–0.9)
MONOCYTES NFR BLD AUTO: 8 %
NEUTROPHILS # BLD AUTO: 1.5 X10E3/UL (ref 1.4–7)
NEUTROPHILS NFR BLD AUTO: 39 %
PLATELET # BLD AUTO: 242 X10E3/UL (ref 150–450)
POTASSIUM SERPL-SCNC: 4.3 MMOL/L (ref 3.5–5.2)
PROT SERPL-MCNC: 7.2 G/DL (ref 6–8.5)
RBC # BLD AUTO: 4.9 X10E6/UL (ref 3.77–5.28)
SODIUM SERPL-SCNC: 143 MMOL/L (ref 134–144)
VIT B12 SERPL-MCNC: 302 PG/ML (ref 232–1245)
WBC # BLD AUTO: 3.9 X10E3/UL (ref 3.4–10.8)

## 2025-01-30 RX ORDER — ERGOCALCIFEROL 1.25 MG/1
50000 CAPSULE, LIQUID FILLED ORAL WEEKLY
Qty: 4 CAPSULE | Refills: 11 | Status: SHIPPED | OUTPATIENT
Start: 2025-01-30

## 2025-02-03 LAB — VIT B1 BLD-SCNC: 74 NMOL/L (ref 66.5–200)

## (undated) DEVICE — SUTURE PDS II SZ 0 L27IN ABSRB VLT L26MM CT-2 1/2 CIR Z334H

## (undated) DEVICE — SOLUTION IRRIG 500ML 0.9% SOD CHLO USP POUR PLAS BTL

## (undated) DEVICE — VISIGI 3D®  CALIBRATION SYSTEM  SIZE 36FR STD W/ BULB: Brand: BOEHRINGER® VISIGI 3D™ SLEEVE GASTRECTOMY CALIBRATION SYSTEM, SIZE 36FR W/BULB

## (undated) DEVICE — FORCEPS BX OVL CUP SERR DISP CAP L 240CM RAD JAW 4

## (undated) DEVICE — GLOVE ORANGE PI 7 1/2   MSG9075

## (undated) DEVICE — TUBING, SUCTION, 1/4" X 12', STRAIGHT: Brand: MEDLINE

## (undated) DEVICE — SUTURE MONOCRYL + SZ 4-0 L27IN ABSRB UD L19MM PS-2 3/8 CIR MCP426H

## (undated) DEVICE — RELOAD STPL H4.1X2MM DIA60MM THCK TISS GRN 6 ROW PWR GST B

## (undated) DEVICE — ELECTRODE PT RET AD L9FT HI MOIST COND ADH HYDRGEL CORDED

## (undated) DEVICE — STAPLER 60MM POWERED ECHELON 3000 LONG 440MM

## (undated) DEVICE — SUTURE ETHILON SZ 3-0 L30IN NONABSORBABLE BLK L30MM PSLX 3/8 1691H

## (undated) DEVICE — SUTURE ETHIBOND EXCL BR GRN TAPR PT 2-0 30 X563H X563H

## (undated) DEVICE — BARIATRIC: Brand: MEDLINE INDUSTRIES, INC.

## (undated) DEVICE — SOLUTION IV LACTATED RINGERS INJECTION USP

## (undated) DEVICE — APPLICATOR LAP 35 CM 2 RIGID VISTASEAL

## (undated) DEVICE — SET TBNG DISP TIP FOR AHTO

## (undated) DEVICE — ENDOSCOPY PUMP TUBING/ CAP SET: Brand: ERBE

## (undated) DEVICE — SLEEVE TRCR L100MM DIA5MM UNIV STBL FOR BLDELSS DIL TIP

## (undated) DEVICE — STAPLER SKIN LN REINF 60 MM ECHELON ENDOPATH

## (undated) DEVICE — GARMENT,MEDLINE,DVT,INT,CALF,MED, GEN2: Brand: MEDLINE

## (undated) DEVICE — APPLIER CLP L SHFT DIA12MM 20 ROT MULT LIGACLP

## (undated) DEVICE — TROCAR ENDOSCP BLDELSS 12X100 MM W/ HNDL STBL SL OPT TIP

## (undated) DEVICE — KIT SUTURING DEVICE M-CLOSE

## (undated) DEVICE — SEALANT TISS 10 CC FOR HUM FIBRIN VISTASEAL

## (undated) DEVICE — TROCAR ENDOSCP L100MM DIA12MM STBL SL BLDELSS ENDOPATH XCEL

## (undated) DEVICE — TROCAR LAP L100MM DIA5MM BLDELSS W/ STBL SL ENDOPATH XCEL

## (undated) DEVICE — SHEARS LAP L45CM DIA5MM ULTRASONIC CRV TIP ADV HEMSTAS HARM

## (undated) DEVICE — Device

## (undated) DEVICE — RELOAD STPL H1.8-3.8MM REG THCK TISS G 6 ROW GRIPPING SURF

## (undated) DEVICE — SOLUTION SURG PREP 26 CC PURPREP

## (undated) DEVICE — STRIP SKIN CLSR W1XL5IN NYLON REINF CURAD STERIL